# Patient Record
Sex: FEMALE | Race: WHITE | NOT HISPANIC OR LATINO | Employment: OTHER | URBAN - METROPOLITAN AREA
[De-identification: names, ages, dates, MRNs, and addresses within clinical notes are randomized per-mention and may not be internally consistent; named-entity substitution may affect disease eponyms.]

---

## 2017-09-26 ENCOUNTER — HISTORICAL (OUTPATIENT)
Dept: LAB | Facility: HOSPITAL | Age: 72
End: 2017-09-26

## 2017-09-26 LAB
ABS NEUT (OLG): 3.81
ALBUMIN SERPL-MCNC: 3.6 GM/DL (ref 3.4–5)
ALBUMIN/GLOB SERPL: 1 RATIO (ref 1.1–2)
ALP SERPL-CCNC: 90 UNIT/L (ref 50–136)
ALT SERPL-CCNC: 88 UNIT/L (ref 12–78)
APTT PPP: 21.9 SECOND(S) (ref 23–32)
AST SERPL-CCNC: 45 UNIT/L (ref 10–37)
BASOPHILS # BLD AUTO: 0.02 X10(3)/MCL
BASOPHILS NFR BLD AUTO: 0.3 %
BILIRUB SERPL-MCNC: 0.4 MG/DL (ref 0.2–1)
BILIRUBIN DIRECT+TOT PNL SERPL-MCNC: 0.07 MG/DL (ref 0.05–0.2)
BILIRUBIN DIRECT+TOT PNL SERPL-MCNC: 0.33 MG/DL
BUN SERPL-MCNC: 16 MG/DL (ref 7–18)
CALCIUM SERPL-MCNC: 9 MG/DL (ref 8.5–10.1)
CHLORIDE SERPL-SCNC: 105 MMOL/L (ref 98–107)
CO2 SERPL-SCNC: 29.2 MMOL/L (ref 21–32)
CREAT SERPL-MCNC: 0.74 MG/DL (ref 0.55–1.02)
EOSINOPHIL # BLD AUTO: 0.13 X10(3)/MCL
EOSINOPHIL NFR BLD AUTO: 2 %
ERYTHROCYTE [DISTWIDTH] IN BLOOD BY AUTOMATED COUNT: 12 %
GLOBULIN SER-MCNC: 3.6 GM/DL (ref 2.4–3.5)
GLUCOSE SERPL-MCNC: 101 MG/DL (ref 74–106)
HCT VFR BLD AUTO: 41.8 % (ref 34–46)
HGB BLD-MCNC: 14 GM/DL (ref 11.3–15.4)
IMM GRANULOCYTES # BLD AUTO: 0.01 10*3/UL (ref 0–0.1)
IMM GRANULOCYTES NFR BLD AUTO: 0.2 % (ref 0–1)
INR PPP: 0.94
LYMPHOCYTES # BLD AUTO: 1.77 X10(3)/MCL
LYMPHOCYTES NFR BLD AUTO: 27.6 %
MCH RBC QN AUTO: 31 PG (ref 27–33)
MCHC RBC AUTO-ENTMCNC: 33.5 GM/DL (ref 32–35)
MCV RBC AUTO: 92.5 FL (ref 81–97)
MONOCYTES # BLD AUTO: 0.67 X10(3)/MCL
MONOCYTES NFR BLD AUTO: 10.5 %
NEUTROPHILS # BLD AUTO: 3.81 X10(3)/MCL
NEUTROPHILS NFR BLD AUTO: 59.4 %
PLATELET # BLD AUTO: 274 X10(3)/MCL (ref 151–368)
PMV BLD AUTO: 10 FL
POTASSIUM SERPL-SCNC: 4 MMOL/L (ref 3.5–5.1)
PROT SERPL-MCNC: 7.2 GM/DL (ref 6.4–8.2)
PROTHROMBIN TIME: 9.6 SECOND(S) (ref 9–11.5)
RBC # BLD AUTO: 4.52 X10(6)/MCL (ref 3.9–5)
SODIUM SERPL-SCNC: 141 MMOL/L (ref 136–145)
WBC # SPEC AUTO: 6.41 X10(3)/MCL (ref 3.4–9.2)

## 2017-10-16 ENCOUNTER — HISTORICAL (OUTPATIENT)
Dept: LAB | Facility: HOSPITAL | Age: 72
End: 2017-10-16

## 2017-10-16 LAB
ALBUMIN SERPL-MCNC: 3.5 GM/DL (ref 3.4–5)
ALBUMIN/GLOB SERPL: 1 RATIO (ref 1.1–2)
ALP SERPL-CCNC: 69 UNIT/L (ref 50–136)
ALT SERPL-CCNC: 44 UNIT/L (ref 12–78)
AST SERPL-CCNC: 31 UNIT/L (ref 10–37)
BILIRUB SERPL-MCNC: 0.4 MG/DL (ref 0.2–1)
BILIRUBIN DIRECT+TOT PNL SERPL-MCNC: 0.06 MG/DL (ref 0.05–0.2)
BILIRUBIN DIRECT+TOT PNL SERPL-MCNC: 0.31 MG/DL
BUN SERPL-MCNC: 14 MG/DL (ref 7–18)
CALCIUM SERPL-MCNC: 8.6 MG/DL (ref 8.5–10.1)
CHLORIDE SERPL-SCNC: 106 MMOL/L (ref 98–107)
CHOLEST SERPL-MCNC: 206 MG/DL (ref 50–200)
CHOLEST/HDLC SERPL: 4 {RATIO} (ref 0–5)
CO2 SERPL-SCNC: 28.7 MMOL/L (ref 21–32)
CREAT SERPL-MCNC: 0.7 MG/DL (ref 0.55–1.02)
GLOBULIN SER-MCNC: 3.5 GM/DL (ref 2.4–3.5)
GLUCOSE SERPL-MCNC: 92 MG/DL (ref 74–106)
HDLC SERPL-MCNC: 56 MG/DL (ref 35–60)
LDLC SERPL CALC-MCNC: 126.4 MG/DL (ref 50–140)
POTASSIUM SERPL-SCNC: 3.8 MMOL/L (ref 3.5–5.1)
PROT SERPL-MCNC: 7 GM/DL (ref 6.4–8.2)
SODIUM SERPL-SCNC: 142 MMOL/L (ref 136–145)
TRIGL SERPL-MCNC: 118 MG/DL (ref 30–150)
VLDLC SERPL CALC-MCNC: 24 MG/DL

## 2017-10-18 ENCOUNTER — HISTORICAL (OUTPATIENT)
Dept: MEDSURG UNIT | Facility: HOSPITAL | Age: 72
End: 2017-10-18

## 2017-10-18 LAB — CRC RECOMMENDATION EXT: NORMAL

## 2018-04-06 ENCOUNTER — HISTORICAL (OUTPATIENT)
Dept: LAB | Facility: HOSPITAL | Age: 73
End: 2018-04-06

## 2018-04-06 LAB
ALBUMIN SERPL-MCNC: 3.8 GM/DL (ref 3.4–5)
ALBUMIN/GLOB SERPL: 1 RATIO (ref 1.1–2)
ALP SERPL-CCNC: 84 UNIT/L (ref 46–116)
ALT SERPL-CCNC: 71 UNIT/L (ref 12–78)
AST SERPL-CCNC: 47 UNIT/L (ref 10–37)
BILIRUB SERPL-MCNC: 0.3 MG/DL (ref 0.2–1)
BILIRUBIN DIRECT+TOT PNL SERPL-MCNC: 0.12 MG/DL (ref 0–0.2)
BILIRUBIN DIRECT+TOT PNL SERPL-MCNC: 0.18 MG/DL
BUN SERPL-MCNC: 23 MG/DL (ref 7–18)
CALCIUM SERPL-MCNC: 9.3 MG/DL (ref 8.5–10.1)
CHLORIDE SERPL-SCNC: 100 MMOL/L (ref 98–107)
CO2 SERPL-SCNC: 29.2 MMOL/L (ref 21–32)
CREAT SERPL-MCNC: 0.7 MG/DL (ref 0.55–1.02)
FOLATE SERPL-MCNC: 22.1 NG/ML (ref 3.1–17.5)
GLOBULIN SER-MCNC: 3.8 GM/DL (ref 2.4–3.5)
GLUCOSE SERPL-MCNC: 98 MG/DL (ref 74–106)
POTASSIUM SERPL-SCNC: 4.8 MMOL/L (ref 3.5–5.1)
PROT SERPL-MCNC: 7.6 GM/DL (ref 6.4–8.2)
RPR SER QL: NORMAL
SODIUM SERPL-SCNC: 137 MMOL/L (ref 136–145)
TSH SERPL-ACNC: 1.31 MIU/ML (ref 0.35–3.75)
VIT B12 SERPL-MCNC: 583 PG/ML (ref 193–986)

## 2019-04-05 ENCOUNTER — HISTORICAL (OUTPATIENT)
Dept: LAB | Facility: HOSPITAL | Age: 74
End: 2019-04-05

## 2019-04-05 LAB
ALBUMIN SERPL-MCNC: 3.7 GM/DL (ref 3.4–5)
ALBUMIN/GLOB SERPL: 1.1 RATIO (ref 1.1–2)
ALP SERPL-CCNC: 69 UNIT/L (ref 46–116)
ALT SERPL-CCNC: 22 UNIT/L (ref 12–78)
AST SERPL-CCNC: 19 UNIT/L (ref 10–37)
BILIRUB SERPL-MCNC: 0.4 MG/DL (ref 0.2–1)
BILIRUBIN DIRECT+TOT PNL SERPL-MCNC: 0.12 MG/DL (ref 0–0.2)
BILIRUBIN DIRECT+TOT PNL SERPL-MCNC: 0.28 MG/DL
BUN SERPL-MCNC: 20 MG/DL (ref 7–18)
CALCIUM SERPL-MCNC: 8.9 MG/DL (ref 8.5–10.1)
CHLORIDE SERPL-SCNC: 105 MMOL/L (ref 98–107)
CHOLEST SERPL-MCNC: 219 MG/DL (ref 50–200)
CHOLEST/HDLC SERPL: 4 {RATIO} (ref 0–5)
CO2 SERPL-SCNC: 28.8 MMOL/L (ref 21–32)
CREAT SERPL-MCNC: 0.75 MG/DL (ref 0.55–1.02)
GLOBULIN SER-MCNC: 3.4 GM/DL (ref 2.4–3.5)
GLUCOSE SERPL-MCNC: 105 MG/DL (ref 74–106)
HDLC SERPL-MCNC: 55 MG/DL (ref 35–60)
LDLC SERPL CALC-MCNC: 140 MG/DL (ref 50–140)
POTASSIUM SERPL-SCNC: 3.8 MMOL/L (ref 3.5–5.1)
PROT SERPL-MCNC: 7.1 GM/DL (ref 6.4–8.2)
SODIUM SERPL-SCNC: 140 MMOL/L (ref 136–145)
TRIGL SERPL-MCNC: 122 MG/DL (ref 30–150)
TSH SERPL-ACNC: 1.19 MIU/ML (ref 0.35–3.75)
VLDLC SERPL CALC-MCNC: 24 MG/DL

## 2019-04-16 ENCOUNTER — HISTORICAL (OUTPATIENT)
Dept: RADIOLOGY | Facility: HOSPITAL | Age: 74
End: 2019-04-16

## 2019-08-22 ENCOUNTER — HISTORICAL (OUTPATIENT)
Dept: LAB | Facility: HOSPITAL | Age: 74
End: 2019-08-22

## 2019-08-22 LAB
BILIRUB SERPL-MCNC: NEGATIVE MG/DL
BLOOD URINE, POC: NORMAL
CLARITY, POC UA: CLEAR
COLOR, POC UA: YELLOW
GLUCOSE UR QL STRIP: NEGATIVE
KETONES UR QL STRIP: NEGATIVE
LEUKOCYTE EST, POC UA: NORMAL
NITRITE, POC UA: NEGATIVE
PH, POC UA: 5
PROTEIN, POC: NEGATIVE
SPECIFIC GRAVITY, POC UA: 1.02
UROBILINOGEN, POC UA: NORMAL

## 2019-08-24 LAB — FINAL CULTURE: NORMAL

## 2020-05-12 ENCOUNTER — HISTORICAL (OUTPATIENT)
Dept: LAB | Facility: HOSPITAL | Age: 75
End: 2020-05-12

## 2020-05-12 LAB
ALBUMIN SERPL-MCNC: 3.7 GM/DL (ref 3.4–4.8)
ALBUMIN/GLOB SERPL: 1.2 RATIO (ref 1.1–2)
ALP SERPL-CCNC: 58 UNIT/L (ref 40–150)
ALT SERPL-CCNC: 28 UNIT/L (ref 0–55)
AST SERPL-CCNC: 25 UNIT/L (ref 5–34)
BILIRUB SERPL-MCNC: 0.4 MG/DL
BILIRUBIN DIRECT+TOT PNL SERPL-MCNC: 0.1 MG/DL (ref 0–0.5)
BILIRUBIN DIRECT+TOT PNL SERPL-MCNC: 0.3 MG/DL
BUN SERPL-MCNC: 17 MG/DL (ref 9.8–20.1)
CALCIUM SERPL-MCNC: 8.8 MG/DL (ref 8.4–10.2)
CHLORIDE SERPL-SCNC: 107 MMOL/L (ref 98–107)
CHOLEST SERPL-MCNC: 243 MG/DL
CHOLEST/HDLC SERPL: 4 {RATIO} (ref 0–5)
CO2 SERPL-SCNC: 29 MEQ/L (ref 23–31)
CREAT SERPL-MCNC: 0.68 MG/DL (ref 0.55–1.02)
GLOBULIN SER-MCNC: 3.1 GM/DL (ref 2.4–3.5)
GLUCOSE SERPL-MCNC: 101 MG/DL (ref 82–115)
HDLC SERPL-MCNC: 54 MG/DL (ref 35–60)
LDLC SERPL CALC-MCNC: 156 MG/DL (ref 50–140)
POTASSIUM SERPL-SCNC: 4.4 MMOL/L (ref 3.5–5.1)
PROT SERPL-MCNC: 6.8 GM/DL (ref 5.8–7.6)
SODIUM SERPL-SCNC: 146 MMOL/L (ref 136–145)
TRIGL SERPL-MCNC: 165 MG/DL (ref 37–140)
VLDLC SERPL CALC-MCNC: 33 MG/DL

## 2021-01-14 ENCOUNTER — HISTORICAL (OUTPATIENT)
Dept: LAB | Facility: HOSPITAL | Age: 76
End: 2021-01-14

## 2021-01-14 LAB
ABS NEUT (OLG): 4.86
ALBUMIN SERPL-MCNC: 3.8 GM/DL (ref 3.4–4.8)
ALBUMIN/GLOB SERPL: 1.3 RATIO (ref 1.1–2)
ALP SERPL-CCNC: 69 UNIT/L (ref 40–150)
ALT SERPL-CCNC: 28 UNIT/L (ref 0–55)
APPEARANCE, UA: CLEAR
AST SERPL-CCNC: 23 UNIT/L (ref 5–34)
BACTERIA SPEC CULT: ABNORMAL
BASOPHILS # BLD AUTO: 0.02 X10(3)/MCL
BASOPHILS NFR BLD AUTO: 0.3 %
BILIRUB SERPL-MCNC: 0.4 MG/DL
BILIRUB UR QL STRIP: NEGATIVE
BILIRUBIN DIRECT+TOT PNL SERPL-MCNC: 0.1 MG/DL (ref 0–0.5)
BILIRUBIN DIRECT+TOT PNL SERPL-MCNC: 0.3 MG/DL
BUN SERPL-MCNC: 11 MG/DL (ref 9.8–20.1)
CALCIUM SERPL-MCNC: 8.6 MG/DL (ref 8.4–10.2)
CHLORIDE SERPL-SCNC: 105 MMOL/L (ref 98–107)
CHOLEST SERPL-MCNC: 243 MG/DL
CHOLEST/HDLC SERPL: 5 {RATIO} (ref 0–5)
CO2 SERPL-SCNC: 31 MEQ/L (ref 23–31)
COLOR UR: YELLOW
CREAT SERPL-MCNC: 0.76 MG/DL (ref 0.55–1.02)
DEPRECATED CALCIDIOL+CALCIFEROL SERPL-MC: 26.1 NG/ML (ref 30–80)
EOSINOPHIL # BLD AUTO: 0.15 X10(3)/MCL
EOSINOPHIL NFR BLD AUTO: 2.1 %
ERYTHROCYTE [DISTWIDTH] IN BLOOD BY AUTOMATED COUNT: 12 %
GLOBULIN SER-MCNC: 3 GM/DL (ref 2.4–3.5)
GLUCOSE (UA): NEGATIVE
GLUCOSE SERPL-MCNC: 103 MG/DL (ref 82–115)
HCT VFR BLD AUTO: 43.3 % (ref 34–46)
HDLC SERPL-MCNC: 47 MG/DL (ref 35–60)
HGB BLD-MCNC: 13.9 GM/DL (ref 11.3–15.4)
HGB UR QL STRIP: ABNORMAL
IMM GRANULOCYTES # BLD AUTO: 0.01 10*3/UL (ref 0–0.1)
IMM GRANULOCYTES NFR BLD AUTO: 0.1 % (ref 0–1)
KETONES UR QL STRIP: NEGATIVE
LDLC SERPL CALC-MCNC: 143 MG/DL (ref 50–140)
LEUKOCYTE ESTERASE UR QL STRIP: NEGATIVE
LYMPHOCYTES # BLD AUTO: 1.51 X10(3)/MCL
LYMPHOCYTES NFR BLD AUTO: 21.3 %
MCH RBC QN AUTO: 30.5 PG (ref 27–33)
MCHC RBC AUTO-ENTMCNC: 32.1 GM/DL (ref 32–35)
MCV RBC AUTO: 95.2 FL (ref 81–97)
MONOCYTES # BLD AUTO: 0.55 X10(3)/MCL
MONOCYTES NFR BLD AUTO: 7.7 %
NEUTROPHILS # BLD AUTO: 4.86 X10(3)/MCL
NEUTROPHILS NFR BLD AUTO: 68.5 %
NITRITE UR QL STRIP: NEGATIVE
PH UR STRIP: 8 [PH] (ref 4.6–8)
PLATELET # BLD AUTO: 242 X10(3)/MCL (ref 140–450)
PMV BLD AUTO: 9 FL
POTASSIUM SERPL-SCNC: 4.3 MMOL/L (ref 3.5–5.1)
PROT SERPL-MCNC: 6.8 GM/DL (ref 5.8–7.6)
PROT UR QL STRIP: NEGATIVE
RBC # BLD AUTO: 4.55 X10(6)/MCL (ref 3.9–5)
RBC #/AREA URNS HPF: ABNORMAL /[HPF]
SODIUM SERPL-SCNC: 143 MMOL/L (ref 136–145)
SP GR UR STRIP: 1.02 (ref 1–1.03)
SQUAMOUS EPITHELIAL, UA: ABNORMAL
TRIGL SERPL-MCNC: 267 MG/DL (ref 37–140)
UROBILINOGEN UR STRIP-ACNC: 0.2
VLDLC SERPL CALC-MCNC: 53 MG/DL
WBC # SPEC AUTO: 7.1 X10(3)/MCL (ref 3.4–9.2)
WBC #/AREA URNS HPF: ABNORMAL /HPF

## 2021-04-05 ENCOUNTER — HISTORICAL (OUTPATIENT)
Dept: LAB | Facility: HOSPITAL | Age: 76
End: 2021-04-05

## 2021-04-05 LAB — SARS-COV-2 RNA RESP QL NAA+PROBE: DETECTED

## 2021-04-07 ENCOUNTER — HISTORICAL (OUTPATIENT)
Dept: INFECTIOUS DISEASES | Facility: HOSPITAL | Age: 76
End: 2021-04-07

## 2021-04-27 ENCOUNTER — HISTORICAL (OUTPATIENT)
Dept: LAB | Facility: HOSPITAL | Age: 76
End: 2021-04-27

## 2021-04-28 ENCOUNTER — HISTORICAL (OUTPATIENT)
Dept: LAB | Facility: HOSPITAL | Age: 76
End: 2021-04-28

## 2021-04-28 LAB
ALBUMIN SERPL-MCNC: 3.6 GM/DL (ref 3.4–4.8)
ALBUMIN/GLOB SERPL: 0.9 RATIO (ref 1.1–2)
ALP SERPL-CCNC: 96 UNIT/L (ref 40–150)
ALT SERPL-CCNC: 28 UNIT/L (ref 0–55)
AST SERPL-CCNC: 22 UNIT/L (ref 5–34)
BILIRUB SERPL-MCNC: 0.4 MG/DL
BILIRUBIN DIRECT+TOT PNL SERPL-MCNC: 0.2 MG/DL
BILIRUBIN DIRECT+TOT PNL SERPL-MCNC: 0.2 MG/DL (ref 0–0.5)
BUN SERPL-MCNC: 20 MG/DL (ref 9.8–20.1)
CALCIUM SERPL-MCNC: 9.5 MG/DL (ref 8.4–10.2)
CHLORIDE SERPL-SCNC: 107 MMOL/L (ref 98–107)
CHOLEST SERPL-MCNC: 171 MG/DL
CHOLEST/HDLC SERPL: 4 {RATIO} (ref 0–5)
CO2 SERPL-SCNC: 31 MEQ/L (ref 23–31)
CREAT SERPL-MCNC: 0.79 MG/DL (ref 0.55–1.02)
GLOBULIN SER-MCNC: 3.9 GM/DL (ref 2.4–3.5)
GLUCOSE SERPL-MCNC: 95 MG/DL (ref 82–115)
HDLC SERPL-MCNC: 44 MG/DL (ref 35–60)
LDLC SERPL CALC-MCNC: 97 MG/DL (ref 50–140)
POTASSIUM SERPL-SCNC: 4.2 MMOL/L (ref 3.5–5.1)
PROT SERPL-MCNC: 7.5 GM/DL (ref 5.8–7.6)
SODIUM SERPL-SCNC: 145 MMOL/L (ref 136–145)
TRIGL SERPL-MCNC: 149 MG/DL (ref 37–140)
VLDLC SERPL CALC-MCNC: 30 MG/DL

## 2021-04-29 ENCOUNTER — HISTORICAL (OUTPATIENT)
Dept: RADIOLOGY | Facility: HOSPITAL | Age: 76
End: 2021-04-29

## 2021-06-04 ENCOUNTER — HISTORICAL (OUTPATIENT)
Dept: RADIOLOGY | Facility: HOSPITAL | Age: 76
End: 2021-06-04

## 2021-06-04 LAB
BCS RECOMMENDATION EXT: NORMAL
BMD RECOMMENDATION EXT: NORMAL

## 2021-08-04 ENCOUNTER — HISTORICAL (OUTPATIENT)
Dept: LAB | Facility: HOSPITAL | Age: 76
End: 2021-08-04

## 2021-08-04 LAB — SARS-COV-2 AG RESP QL IA.RAPID: NEGATIVE

## 2022-04-10 ENCOUNTER — HISTORICAL (OUTPATIENT)
Dept: ADMINISTRATIVE | Facility: HOSPITAL | Age: 77
End: 2022-04-10

## 2022-04-28 VITALS
DIASTOLIC BLOOD PRESSURE: 67 MMHG | WEIGHT: 147.25 LBS | BODY MASS INDEX: 27.1 KG/M2 | HEIGHT: 62 IN | SYSTOLIC BLOOD PRESSURE: 115 MMHG | OXYGEN SATURATION: 98 %

## 2022-04-30 NOTE — H&P
Patient:   Lupis Peguero            MRN: [id number]            FIN: [id number]               Age:   72 years     Sex:  Female     :  1945   Associated Diagnoses:   None   Author:   Naveed Chapman MD      Visit Information   Accompanied by:  No one.    Source of history:  Self.       Chief Complaint   2017 10:07 CDT      ROUTIEN CHECK LEFT EYE VISION CHANGES        History of Present Illness   HTN  - jimmy med  - no HA  - pt without any complaints               The patient presents for a general exam.        Interval History   Hyperlipidemia  - watching diet      Review of Systems   Constitutional   Eye:  Recent visual problem, Visual disturbances, Left eye, intermittent, flashing lights on outer half of eye.    Cardiovascular:  No chest pain, No peripheral edema.    Respiratory:  No shortness of breath.    Gastrointestinal:  no fam hx of colon cancer, last colonoscopy at age 52, No nausea, No vomiting, No diarrhea, No constipation, No change in bowel habits, No melena.         Rectal bleeding: None.    Endocrine   Musculoskeletal:  No muscle pain.    Neurologic:  No headache.       Health Status   Allergies:    Allergic Reactions (Selected)  No Known Allergies,    Allergies (1) Active Reaction  No Known Allergies None Documented     Current medications:  (Selected)   Prescriptions  Prescribed  lisinopril 10 mg oral tablet: 10 mg = 1 tab(s), Oral, Daily, # 90 tab(s), 3 Refill(s), Pharmacy: [facility]/pharmacy #[id number]  montelukast 10 mg oral TABLET: See Instructions, TAKE 1 TABLET BY MOUTH ONCE DAILY, # 30 tab(s), 2 Refill(s), eRx: [facility]/pharmacy #[id number]  Documented Medications  Documented  CoQ10: 300 mg, Oral, Daily, 0 Refill(s)  melatonin 10 mg oral capsule: 10 mg = 1 cap(s), Oral, Once a day (at bedtime), 0 Refill(s),    Home Medications (4) Active  CoQ10 300 mg, Oral, Daily  lisinopril 10 mg oral tablet 10 mg = 1 tab(s), Oral, Daily  melatonin 10 mg oral capsule 10 mg = 1 cap(s), Oral, Once a day  (at bedtime)  montelukast 10 mg oral TABLET See Instructions     Problem list:    All Problems  Memory loss / SNOMED CT 43222048 / Confirmed  Anxiety / SNOMED CT 61449765 / Confirmed  GERD - Gastro-esophageal reflux disease / SNOMED CT 3959072811 / Confirmed  Hyperlipidemia / SNOMED CT 24808613 / Confirmed  Hypertension / SNOMED CT 8528389398 / Confirmed  Osteoporosis / SNOMED CT 234626962 / Confirmed  Resolved: Other screening mammogram / SNOMED CT 827844110  Resolved: Skin lesion / SNOMED CT 620988480,    Active Problems (6)  Anxiety   GERD - Gastro-esophageal reflux disease   Hyperlipidemia   Hypertension   Memory loss   Osteoporosis         Histories   Past Medical History:    Active  Anxiety (95954458)  GERD - Gastro-esophageal reflux disease (2326570182)  Hypertension (9146992487)  Hyperlipidemia (81755865)  Osteoporosis (044960398)  Memory loss (17766280)  Resolved  Skin lesion (468733834):  Resolved.  Other screening mammogram (880981622):  Resolved.   Family History:    Diabetes mellitus type 2  Father  CAD (coronary artery disease)  Mother  Hypertension.  Mother  Father     Procedure history:    Excision Synovial Cyst (Left, Hand) performed by Donta Reeves MD on 9/1/2016 at 71 Years.  Comments:  9/1/2016 11:04 - Jorden Neal CRNA.  auto-populated from documented surgical case  Excision Mass Thigh (Left, Upper Leg) performed by Donta Reeves MD on 9/1/2016 at 71 Years.  Comments:  9/1/2016 11:04 - Jorden Neal CRNA.  auto-populated from documented surgical case  BACK SURG.   Social History        Social & Psychosocial Habits    Alcohol  09/22/2015  Use: Never    Home/Environment  09/25/2017  Lives with: Alone    Substance Abuse  09/22/2015  Use: Never    Tobacco  09/22/2015  Use: Never smoker  .        Physical Examination   Vital Signs   9/25/2017 10:07 CDT      Temperature Tympanic      36.2 DegC                             Peripheral Pulse Rate     80 bpm                              Respiratory Rate          18 br/min                             Systolic Blood Pressure   136 mmHg                             Diastolic Blood Pressure  80 mmHg     Measurements from flowsheet : Measurements   9/25/2017 10:07 CDT      Weight Dosing             64.2 kg                             Weight Measured           64.12 kg                             Weight Measured and Calculated in Lbs     141.54 lb                             Height/Length Dosing      157 cm                             Height/Length Measured    157 cm                             BSA Measured              1.67 m2                             Body Mass Index Measured  26.01 kg/m2     General:  Alert and oriented, No acute distress.         Skin: No cyanosis, No clubbing, No edema.    Eye:  Pupils are equal, round and reactive to light, Extraocular movements are intact.    Respiratory:  Lungs are clear to auscultation, Breath sounds are equal, Symmetrical chest wall expansion, No wheezes/rales/rhonchi.    Cardiovascular:  Regular rhythm, No murmur, No gallop, No Rub.    Gastrointestinal:  Soft, Non-tender, Non-distended, Normal bowel sounds.    Integumentary:  Warm.    Neurologic:  Alert, Oriented.    Psychiatric:  Cooperative, Appropriate mood & affect, Normal judgment.       Health Maintenance      Review / Management   Laboratory Results   Reviewed labs   Documentation reviewed:  Reviewed prior records.       Impression and Plan   Diagnosis     Colon cancer screening (EIL44-QB Z12.11).     Hypertension (XDO79-FR I10).     Hyperlipidemia (JAO11-NZ E78.5).     Visual disturbance (YCK63-WX H53.9).     Plan:  1.  Hypertension  Controlled  Continue current medication  2.  Hyperlipidemia  Continue diet modification  Check FLP  3.  Visual disturbance  Patient to schedule an appointment with [facility]  4.  Colon cancer screening  Procedure discussed at length with the patient  Risk and other choices were discussed  Patient agreed to go  forward with the procedure  Colon Preparation sheet was given  Prescription for the colon prep was given  Patient gives consent to discuss the results with any family members on the morning of the procedure  Procedure consents were signed  Admit orders were given  Procedure was scheduled for 10/18/17  5.  Screening  Check lab work (CMP, CBC, FLP)      I have examined the patient and there is no change in the patient's condition since the recent history and physical exam was performed.

## 2022-06-06 DIAGNOSIS — Z00.00 WELLNESS EXAMINATION: Primary | ICD-10-CM

## 2022-06-06 DIAGNOSIS — Z79.899 LONG-TERM USE OF HIGH-RISK MEDICATION: ICD-10-CM

## 2022-06-06 RX ORDER — ATORVASTATIN CALCIUM 10 MG/1
1 TABLET, FILM COATED ORAL DAILY
COMMUNITY
Start: 2021-07-06 | End: 2022-06-10

## 2022-06-06 RX ORDER — FLUOXETINE 10 MG/1
1 CAPSULE ORAL DAILY
COMMUNITY
Start: 2022-02-01 | End: 2022-08-08

## 2022-06-06 RX ORDER — ESOMEPRAZOLE MAGNESIUM 40 MG/1
1 CAPSULE, DELAYED RELEASE ORAL DAILY
COMMUNITY
Start: 2021-10-25 | End: 2022-10-03

## 2022-06-06 RX ORDER — LISINOPRIL 20 MG/1
1 TABLET ORAL DAILY
COMMUNITY
Start: 2021-11-24 | End: 2022-08-19

## 2022-06-06 RX ORDER — MONTELUKAST SODIUM 10 MG/1
1 TABLET ORAL DAILY
COMMUNITY
Start: 2021-10-07 | End: 2022-11-09 | Stop reason: SDUPTHER

## 2022-06-06 RX ORDER — ALBUTEROL SULFATE 90 UG/1
1 AEROSOL, METERED RESPIRATORY (INHALATION) EVERY 6 HOURS PRN
COMMUNITY
Start: 2021-05-17 | End: 2022-08-09

## 2022-06-06 RX ORDER — FLUTICASONE PROPIONATE 50 MCG
1 SPRAY, SUSPENSION (ML) NASAL DAILY
COMMUNITY
Start: 2021-12-06 | End: 2024-01-03 | Stop reason: SDUPTHER

## 2022-06-07 ENCOUNTER — OFFICE VISIT (OUTPATIENT)
Dept: FAMILY MEDICINE | Facility: CLINIC | Age: 77
End: 2022-06-07
Payer: MEDICARE

## 2022-06-07 VITALS
TEMPERATURE: 98 F | OXYGEN SATURATION: 97 % | WEIGHT: 149.94 LBS | BODY MASS INDEX: 27.59 KG/M2 | HEART RATE: 76 BPM | RESPIRATION RATE: 16 BRPM | DIASTOLIC BLOOD PRESSURE: 71 MMHG | SYSTOLIC BLOOD PRESSURE: 136 MMHG | HEIGHT: 62 IN

## 2022-06-07 DIAGNOSIS — F33.42 RECURRENT MAJOR DEPRESSIVE DISORDER, IN FULL REMISSION: ICD-10-CM

## 2022-06-07 DIAGNOSIS — Z28.21 HERPES ZOSTER VACCINATION DECLINED: ICD-10-CM

## 2022-06-07 DIAGNOSIS — F51.01 PRIMARY INSOMNIA: ICD-10-CM

## 2022-06-07 DIAGNOSIS — E78.2 MIXED HYPERLIPIDEMIA: ICD-10-CM

## 2022-06-07 DIAGNOSIS — I10 PRIMARY HYPERTENSION: ICD-10-CM

## 2022-06-07 DIAGNOSIS — E55.9 VITAMIN D DEFICIENCY: ICD-10-CM

## 2022-06-07 DIAGNOSIS — Z53.20 MAMMOGRAM DECLINED: ICD-10-CM

## 2022-06-07 DIAGNOSIS — Z00.00 MEDICARE ANNUAL WELLNESS VISIT, SUBSEQUENT: Primary | ICD-10-CM

## 2022-06-07 DIAGNOSIS — R06.02 SHORTNESS OF BREATH: ICD-10-CM

## 2022-06-07 DIAGNOSIS — K21.9 GASTROESOPHAGEAL REFLUX DISEASE, UNSPECIFIED WHETHER ESOPHAGITIS PRESENT: ICD-10-CM

## 2022-06-07 PROBLEM — G47.00 INSOMNIA: Status: ACTIVE | Noted: 2022-06-07

## 2022-06-07 PROBLEM — R41.3 AMNESIA: Status: ACTIVE | Noted: 2022-06-07

## 2022-06-07 PROBLEM — F33.9 RECURRENT MAJOR DEPRESSIVE DISORDER: Status: ACTIVE | Noted: 2022-06-07

## 2022-06-07 PROBLEM — E78.5 HYPERLIPIDEMIA: Status: ACTIVE | Noted: 2022-06-07

## 2022-06-07 PROBLEM — F41.9 ANXIETY: Status: ACTIVE | Noted: 2022-06-07

## 2022-06-07 PROCEDURE — G0439 PR MEDICARE ANNUAL WELLNESS SUBSEQUENT VISIT: ICD-10-PCS | Mod: ,,, | Performed by: FAMILY MEDICINE

## 2022-06-07 PROCEDURE — G0439 PPPS, SUBSEQ VISIT: HCPCS | Mod: ,,, | Performed by: FAMILY MEDICINE

## 2022-06-07 RX ORDER — DOCUSATE SODIUM 100 MG/1
100 CAPSULE, LIQUID FILLED ORAL 2 TIMES DAILY
COMMUNITY
End: 2023-03-06

## 2022-06-07 RX ORDER — ACETAMINOPHEN, DIPHENHYDRAMINE HCL, PHENYLEPHRINE HCL 325; 25; 5 MG/1; MG/1; MG/1
TABLET ORAL
COMMUNITY

## 2022-06-07 RX ORDER — ASPIRIN 81 MG/1
81 TABLET ORAL DAILY
COMMUNITY

## 2022-06-07 RX ORDER — CHOLECALCIFEROL (VITAMIN D3) 25 MCG
1000 TABLET ORAL DAILY
COMMUNITY

## 2022-06-07 RX ORDER — EPINEPHRINE 0.22MG
100 AEROSOL WITH ADAPTER (ML) INHALATION DAILY
COMMUNITY

## 2022-06-07 NOTE — PROGRESS NOTES
Lupis Peguero is a 76 y.o. female who presents today for her subsequent annual wellness visit. It has been at least 11 months since his previous annual wellness visit.     74 yo female here for wellness visit. She has never smoked. She does not drink. She does not exercise and eats a regular diet. Denies melena, hematochezia, vaginal bleeding. no recent falls      MM/21, declines this year. Patient was counseled on risks of declining breast cancer screening. All questions were answered. Patient verbalized understanding.  DEXA:   C-scope:  normal, repeat    Zostavax: declined due to financial reasons  Pneumovax 23:   Advance Directive:     HTN: at goal.  She is on lisinopril 20 mg daily. ON aspirin    HLD: doing well on statin    Depression: well controlled with prozac    GERD: well controlled with PPI    Insomnia: well controlled with trazodone 100 mg nightly Review of Systems General: denies f/c, weight loss, night sweats, decreased appetite      Past/personal medical history, Active problem list, family history, social history, allergies and medication list have all been reviewed, updated and documented during this encounter. Also a list of all current physicians and/or suppliers of medical care has been updated in the Care Teams section of this encounter.      PHQ2 questionnaire has been completed for this patient and it is located in the flow sheets section of this encounter.  The patient completed HRA from her previous AWV was reviewed with the her again today and any identified issues are listed in the diagnoses and health risks section below.    Other tests done today and results:  1. Whisper test - Normal  2. Get up and go test - Normal  3. MiniCog test (results scanned into chart) - Normal    I attest that I have reviewed the flow sheeting and nursing notes which includes the TUG test, Whisper Screen, HRA, Mini-cognitive assessment, Fall and Depression Screen.    As a separate e/m  "visit, patient c/o shortness of breath x 6 months. It started around the time she had covid. States that it only happens when she bends forward, not when walking or climbing stairs. She also gets short of breath if she is taking too much. Denies wheezing, coughing. She does not have asthma or copd, she has never smoked. She has been exposed to second hand smoke. Both parents had heart disease. Denies cp, chest pressure, palpitations, edema, headaches, vision changes.         Review of Systems     General: denies f/c, weight loss, night sweats, decreased appetite  Eye: denies blurred vision, changes in vision  Respiratory: denies  wheezing, cough  Cardiovascular: denies chest pain, palpitations, edema  Gastrointestinal: denies abdominal pain, n/v, constipation, diarrhea  Integumentary: denies rashes, pruritis      Vitals:    06/07/22 0940   BP: 136/71   Pulse: 76   Resp: 16   Temp: 98.1 °F (36.7 °C)   TempSrc: Tympanic   SpO2: 97%   Weight: 68 kg (149 lb 14.6 oz)   Height: 5' 1.81" (1.57 m)     Physical Exam  Gen: alert pleasant  Eyes: EOMI, no scleral injection  ENT: TM+ light reflexes bilaterally, no cerumen impaction or canal edema/erythema/discharge. No pharyngeal erythema, tonsillary hypertrophy  Oropharynx: no tonsillary hypertrophy, dental caries or disease. No lesions of palate, tongue.  CV: RRR no m/r/g, no LE edema. No JVD  Resp: CTAB, no accessory muscle use  GI: BS+, nontender, nondistended  Lymph: no cervical, postauricular, submandibular nodes  Psych: AA&Ox3    Medicare annual wellness visit, subsequent [Z00.00]      Diagnoses and health risks identified today and associated recommendations/orders:  There are no diagnoses linked to this encounter.        1. Medicare annual wellness visit, subsequent    2. Primary hypertension  Overview:  at goal. She is on lisinopril 20 mg daily. ON aspirin        Assessment & Plan:  continue current meds, low salt diet, weight loss and exercise  Avoid drinking too much " caffeine  Call me with pressure more than 140/90        3. Mixed hyperlipidemia  Overview:  At goal on statin    Assessment & Plan:  Continue statin      4. Recurrent major depressive disorder, in full remission  Overview:  Well controlled with prozac    Assessment & Plan:  Continue prozac      5. Primary insomnia  Overview:  Well controlled with trazodone 100 mg nightly    Assessment & Plan:  Continue trazodone      6. Gastroesophageal reflux disease, unspecified whether esophagitis present  Overview:  Well controlled with nexium    Assessment & Plan:  Continue nexium and GERD diet      7. Vitamin D deficiency    8. Shortness of breath  -     X-Ray Chest PA And Lateral  -     EKG 12-lead  -     Complete PFT with bronchodilator    9. Mammogram declined    10. Herpes zoster vaccination declined      Exercise for a total of 150 min per week and eat a healthy diet  Stay up to date with all cancer screening discussed in visit  Immunizations due were discussed during visit  All health maintenance was reviewed with patient. Patient verbalized understanding. All questions were answered.   Patient was counseled on risks/benefits of receiving immunization. All questions were answered    An updated personalized 5-10 year written screening and prevention plan was provided to her today. Please see the AVW for those details.  The health maintenance module has been updated during this encounter.  Follow up in about 6 months (around 12/7/2022) for routine follow up.

## 2022-06-08 ENCOUNTER — PATIENT MESSAGE (OUTPATIENT)
Dept: FAMILY MEDICINE | Facility: CLINIC | Age: 77
End: 2022-06-08
Payer: MEDICARE

## 2022-06-08 ENCOUNTER — TELEPHONE (OUTPATIENT)
Dept: FAMILY MEDICINE | Facility: CLINIC | Age: 77
End: 2022-06-08
Payer: MEDICARE

## 2022-06-08 ENCOUNTER — CLINICAL SUPPORT (OUTPATIENT)
Dept: RESPIRATORY THERAPY | Facility: HOSPITAL | Age: 77
End: 2022-06-08
Attending: FAMILY MEDICINE
Payer: MEDICARE

## 2022-06-08 ENCOUNTER — HOSPITAL ENCOUNTER (OUTPATIENT)
Dept: RADIOLOGY | Facility: HOSPITAL | Age: 77
Discharge: HOME | End: 2022-06-08
Attending: FAMILY MEDICINE
Payer: MEDICARE

## 2022-06-08 DIAGNOSIS — R06.02 SHORTNESS OF BREATH: ICD-10-CM

## 2022-06-08 PROCEDURE — 71046 X-RAY EXAM CHEST 2 VIEWS: CPT | Mod: TC

## 2022-06-08 PROCEDURE — 93005 ELECTROCARDIOGRAM TRACING: CPT

## 2022-06-08 PROCEDURE — 99900031 HC PATIENT EDUCATION (STAT)

## 2022-06-08 NOTE — TELEPHONE ENCOUNTER
----- Message from Nhung Farrell MD sent at 6/8/2022  2:06 PM CDT -----  Cholesterol is much worse. Is she taking statin daily? If so, I would recommend increasing.     She does have some blood and leukocytes in urine. Is she having any uti symptoms?     Ekg and cxr normal. We will see what pft shows.

## 2022-06-09 NOTE — TELEPHONE ENCOUNTER
Patient states she is taking daily statin. Would like to increase dosage.     Pt states she had a little itch in vaginal area, but denies all UTI symptoms.

## 2022-06-10 RX ORDER — ATORVASTATIN CALCIUM 20 MG/1
20 TABLET, FILM COATED ORAL DAILY
Qty: 90 TABLET | Refills: 6 | Status: SHIPPED | OUTPATIENT
Start: 2022-06-10 | End: 2023-09-05

## 2022-08-08 DIAGNOSIS — Z11.52 ENCOUNTER FOR SCREENING FOR COVID-19: Primary | ICD-10-CM

## 2022-08-09 ENCOUNTER — PROCEDURE VISIT (OUTPATIENT)
Dept: RESPIRATORY THERAPY | Facility: HOSPITAL | Age: 77
End: 2022-08-09
Attending: FAMILY MEDICINE
Payer: MEDICARE

## 2022-08-09 DIAGNOSIS — R06.02 SHORTNESS OF BREATH: ICD-10-CM

## 2022-08-09 PROCEDURE — 99900031 HC PATIENT EDUCATION (STAT)

## 2022-09-16 ENCOUNTER — HISTORICAL (OUTPATIENT)
Dept: ADMINISTRATIVE | Facility: HOSPITAL | Age: 77
End: 2022-09-16
Payer: MEDICARE

## 2022-10-03 DIAGNOSIS — K21.9 GASTROESOPHAGEAL REFLUX DISEASE, UNSPECIFIED WHETHER ESOPHAGITIS PRESENT: Primary | ICD-10-CM

## 2022-10-03 RX ORDER — ESOMEPRAZOLE MAGNESIUM 40 MG/1
CAPSULE, DELAYED RELEASE ORAL
Qty: 90 CAPSULE | Refills: 1 | Status: SHIPPED | OUTPATIENT
Start: 2022-10-03 | End: 2023-03-29

## 2022-10-12 ENCOUNTER — OFFICE VISIT (OUTPATIENT)
Dept: FAMILY MEDICINE | Facility: CLINIC | Age: 77
End: 2022-10-12
Payer: MEDICARE

## 2022-10-12 VITALS
WEIGHT: 152.19 LBS | HEIGHT: 62 IN | TEMPERATURE: 96 F | DIASTOLIC BLOOD PRESSURE: 69 MMHG | SYSTOLIC BLOOD PRESSURE: 139 MMHG | HEART RATE: 66 BPM | BODY MASS INDEX: 28.01 KG/M2 | OXYGEN SATURATION: 98 % | RESPIRATION RATE: 16 BRPM

## 2022-10-12 DIAGNOSIS — R06.09 DYSPNEA ON EXERTION: Primary | ICD-10-CM

## 2022-10-12 PROCEDURE — 1159F MED LIST DOCD IN RCRD: CPT | Mod: CPTII,,, | Performed by: FAMILY MEDICINE

## 2022-10-12 PROCEDURE — 99213 PR OFFICE/OUTPT VISIT, EST, LEVL III, 20-29 MIN: ICD-10-PCS | Mod: ,,, | Performed by: FAMILY MEDICINE

## 2022-10-12 PROCEDURE — 99213 OFFICE O/P EST LOW 20 MIN: CPT | Mod: ,,, | Performed by: FAMILY MEDICINE

## 2022-10-12 PROCEDURE — 1126F AMNT PAIN NOTED NONE PRSNT: CPT | Mod: CPTII,,, | Performed by: FAMILY MEDICINE

## 2022-10-12 PROCEDURE — 1101F PR PT FALLS ASSESS DOC 0-1 FALLS W/OUT INJ PAST YR: ICD-10-PCS | Mod: CPTII,,, | Performed by: FAMILY MEDICINE

## 2022-10-12 PROCEDURE — 3288F PR FALLS RISK ASSESSMENT DOCUMENTED: ICD-10-PCS | Mod: CPTII,,, | Performed by: FAMILY MEDICINE

## 2022-10-12 PROCEDURE — 1159F PR MEDICATION LIST DOCUMENTED IN MEDICAL RECORD: ICD-10-PCS | Mod: CPTII,,, | Performed by: FAMILY MEDICINE

## 2022-10-12 PROCEDURE — 4010F ACE/ARB THERAPY RXD/TAKEN: CPT | Mod: CPTII,,, | Performed by: FAMILY MEDICINE

## 2022-10-12 PROCEDURE — 1101F PT FALLS ASSESS-DOCD LE1/YR: CPT | Mod: CPTII,,, | Performed by: FAMILY MEDICINE

## 2022-10-12 PROCEDURE — 1160F RVW MEDS BY RX/DR IN RCRD: CPT | Mod: CPTII,,, | Performed by: FAMILY MEDICINE

## 2022-10-12 PROCEDURE — 1126F PR PAIN SEVERITY QUANTIFIED, NO PAIN PRESENT: ICD-10-PCS | Mod: CPTII,,, | Performed by: FAMILY MEDICINE

## 2022-10-12 PROCEDURE — 3288F FALL RISK ASSESSMENT DOCD: CPT | Mod: CPTII,,, | Performed by: FAMILY MEDICINE

## 2022-10-12 PROCEDURE — 1160F PR REVIEW ALL MEDS BY PRESCRIBER/CLIN PHARMACIST DOCUMENTED: ICD-10-PCS | Mod: CPTII,,, | Performed by: FAMILY MEDICINE

## 2022-10-12 PROCEDURE — 4010F PR ACE/ARB THEARPY RXD/TAKEN: ICD-10-PCS | Mod: CPTII,,, | Performed by: FAMILY MEDICINE

## 2022-10-17 ENCOUNTER — DOCUMENTATION ONLY (OUTPATIENT)
Dept: ADMINISTRATIVE | Facility: HOSPITAL | Age: 77
End: 2022-10-17
Payer: MEDICARE

## 2022-10-19 ENCOUNTER — HOSPITAL ENCOUNTER (OUTPATIENT)
Dept: CARDIOLOGY | Facility: HOSPITAL | Age: 77
Discharge: HOME | End: 2022-10-19
Attending: FAMILY MEDICINE
Payer: MEDICARE

## 2022-10-19 DIAGNOSIS — R06.09 DYSPNEA ON EXERTION: ICD-10-CM

## 2022-10-19 LAB
AORTIC ROOT ANNULUS: 3.41 CM
AORTIC VALVE CUSP SEPERATION: 1.04 CM
AV PEAK GRADIENT: 13 MMHG
AV REGURGITATION PRESSURE HALF TIME: 915.38 MS
AV VELOCITY RATIO: 0.69
CV ECHO LV RWT: 0.6 CM
DOP CALC AO PEAK VEL: 1.81 M/S
DOP CALC LVOT AREA: 3.4 CM2
DOP CALC LVOT DIAMETER: 2.07 CM
DOP CALC LVOT PEAK VEL: 1.25 M/S
E WAVE DECELERATION TIME: 202.55 MSEC
E/A RATIO: 0.77
ECHO LV POSTERIOR WALL: 1.06 CM (ref 0.6–1.1)
EJECTION FRACTION: 63 %
FRACTIONAL SHORTENING: 34 % (ref 28–44)
INTERVENTRICULAR SEPTUM: 1.03 CM (ref 0.6–1.1)
LEFT ATRIUM SIZE: 0 CM
LEFT INTERNAL DIMENSION IN SYSTOLE: 2.33 CM (ref 2.1–4)
LEFT VENTRICLE DIASTOLIC VOLUME: 51.6 ML
LEFT VENTRICLE SYSTOLIC VOLUME: 18.67 ML
LEFT VENTRICULAR INTERNAL DIMENSION IN DIASTOLE: 3.52 CM (ref 3.5–6)
LEFT VENTRICULAR MASS: 111.21 G
MV PEAK A VEL: 1.5 M/S
MV PEAK E VEL: 1.15 M/S
MV STENOSIS PRESSURE HALF TIME: 58.74 MS
MV VALVE AREA P 1/2 METHOD: 3.75 CM2
PISA AR MAX VEL: 1.99 M/S
PV PEAK VELOCITY: 1.15 CM/S
RA PRESSURE: 3 MMHG
RIGHT VENTRICULAR END-DIASTOLIC DIMENSION: 2.96 CM
TRICUSPID VALVE PEAK A WAVE VELOCITY: 0.66 M/S
TV PEAK E VEL: 0.6 M/S
TV STENOSIS PRESSURE HALF TIME: 63.73 MS
TV VALVE AREA P 1/2 METHOD: 2.98 CM2

## 2022-10-19 PROCEDURE — 93306 TTE W/DOPPLER COMPLETE: CPT

## 2022-11-09 ENCOUNTER — OFFICE VISIT (OUTPATIENT)
Dept: FAMILY MEDICINE | Facility: CLINIC | Age: 77
End: 2022-11-09
Payer: MEDICARE

## 2022-11-09 VITALS
RESPIRATION RATE: 18 BRPM | WEIGHT: 153.19 LBS | SYSTOLIC BLOOD PRESSURE: 139 MMHG | BODY MASS INDEX: 28.19 KG/M2 | HEIGHT: 62 IN | OXYGEN SATURATION: 98 % | TEMPERATURE: 98 F | DIASTOLIC BLOOD PRESSURE: 66 MMHG | HEART RATE: 67 BPM

## 2022-11-09 DIAGNOSIS — R06.09 DYSPNEA ON EXERTION: Primary | ICD-10-CM

## 2022-11-09 DIAGNOSIS — J30.9 ALLERGIC RHINITIS, UNSPECIFIED SEASONALITY, UNSPECIFIED TRIGGER: ICD-10-CM

## 2022-11-09 PROCEDURE — 1101F PT FALLS ASSESS-DOCD LE1/YR: CPT | Mod: CPTII,,, | Performed by: FAMILY MEDICINE

## 2022-11-09 PROCEDURE — 3075F PR MOST RECENT SYSTOLIC BLOOD PRESS GE 130-139MM HG: ICD-10-PCS | Mod: CPTII,,, | Performed by: FAMILY MEDICINE

## 2022-11-09 PROCEDURE — 99214 OFFICE O/P EST MOD 30 MIN: CPT | Mod: ,,, | Performed by: FAMILY MEDICINE

## 2022-11-09 PROCEDURE — 4010F ACE/ARB THERAPY RXD/TAKEN: CPT | Mod: CPTII,,, | Performed by: FAMILY MEDICINE

## 2022-11-09 PROCEDURE — 3078F DIAST BP <80 MM HG: CPT | Mod: CPTII,,, | Performed by: FAMILY MEDICINE

## 2022-11-09 PROCEDURE — 4010F PR ACE/ARB THEARPY RXD/TAKEN: ICD-10-PCS | Mod: CPTII,,, | Performed by: FAMILY MEDICINE

## 2022-11-09 PROCEDURE — 1126F PR PAIN SEVERITY QUANTIFIED, NO PAIN PRESENT: ICD-10-PCS | Mod: CPTII,,, | Performed by: FAMILY MEDICINE

## 2022-11-09 PROCEDURE — 1126F AMNT PAIN NOTED NONE PRSNT: CPT | Mod: CPTII,,, | Performed by: FAMILY MEDICINE

## 2022-11-09 PROCEDURE — 1160F RVW MEDS BY RX/DR IN RCRD: CPT | Mod: CPTII,,, | Performed by: FAMILY MEDICINE

## 2022-11-09 PROCEDURE — 1101F PR PT FALLS ASSESS DOC 0-1 FALLS W/OUT INJ PAST YR: ICD-10-PCS | Mod: CPTII,,, | Performed by: FAMILY MEDICINE

## 2022-11-09 PROCEDURE — 3288F FALL RISK ASSESSMENT DOCD: CPT | Mod: CPTII,,, | Performed by: FAMILY MEDICINE

## 2022-11-09 PROCEDURE — 3078F PR MOST RECENT DIASTOLIC BLOOD PRESSURE < 80 MM HG: ICD-10-PCS | Mod: CPTII,,, | Performed by: FAMILY MEDICINE

## 2022-11-09 PROCEDURE — 99214 PR OFFICE/OUTPT VISIT, EST, LEVL IV, 30-39 MIN: ICD-10-PCS | Mod: ,,, | Performed by: FAMILY MEDICINE

## 2022-11-09 PROCEDURE — 1160F PR REVIEW ALL MEDS BY PRESCRIBER/CLIN PHARMACIST DOCUMENTED: ICD-10-PCS | Mod: CPTII,,, | Performed by: FAMILY MEDICINE

## 2022-11-09 PROCEDURE — 3288F PR FALLS RISK ASSESSMENT DOCUMENTED: ICD-10-PCS | Mod: CPTII,,, | Performed by: FAMILY MEDICINE

## 2022-11-09 PROCEDURE — 1159F PR MEDICATION LIST DOCUMENTED IN MEDICAL RECORD: ICD-10-PCS | Mod: CPTII,,, | Performed by: FAMILY MEDICINE

## 2022-11-09 PROCEDURE — 3075F SYST BP GE 130 - 139MM HG: CPT | Mod: CPTII,,, | Performed by: FAMILY MEDICINE

## 2022-11-09 PROCEDURE — 1159F MED LIST DOCD IN RCRD: CPT | Mod: CPTII,,, | Performed by: FAMILY MEDICINE

## 2022-11-09 RX ORDER — MONTELUKAST SODIUM 10 MG/1
10 TABLET ORAL DAILY
Qty: 30 TABLET | Refills: 11 | Status: SHIPPED | OUTPATIENT
Start: 2022-11-09 | End: 2023-11-02

## 2022-11-09 RX ORDER — ALBUTEROL SULFATE 90 UG/1
2 AEROSOL, METERED RESPIRATORY (INHALATION) EVERY 4 HOURS PRN
Qty: 18 G | Refills: 1 | Status: SHIPPED | OUTPATIENT
Start: 2022-11-09 | End: 2023-05-08

## 2022-11-09 NOTE — PROGRESS NOTES
Patient ID: [id number]     Chief Complaint: Follow-up        HPI:     Lupis Peguero is a 77 y.o. female here today for Follow-up for dyspnea with exertion. Echocardiogram completed since last appointment. Possible reduced diffusion capacity noted on PFT. Echo largely unremarkable.          ----------------------------  Amnesia  Anxiety disorder, unspecified  GERD (gastroesophageal reflux disease)  HTN (hypertension)  Insomnia  Major depression, recurrent  Mass of right breast  Mixed hyperlipidemia     Past Surgical History:   Procedure Laterality Date    APPENDECTOMY  1959    ASPIRATION OF SYNOVIAL CYST Left 09/01/2016    hand    BACK SURGERY  1996    COLONOSCOPY  10/18/2017    COLONOSCOPY  1997    MASS EXCISION Left 09/01/2016    thigh       Review of patient's allergies indicates:  No Known Allergies    Outpatient Medications Marked as Taking for the 11/9/22 encounter (Office Visit) with Chema Weldon, DO   Medication Sig Dispense Refill    aspirin (ECOTRIN) 81 MG EC tablet Take 81 mg by mouth once daily.      atorvastatin (LIPITOR) 20 MG tablet Take 1 tablet (20 mg total) by mouth once daily. 90 tablet 6    coenzyme Q10 100 mg capsule Take 100 mg by mouth once daily.      docusate sodium (COLACE) 100 MG capsule Take 100 mg by mouth 2 (two) times daily.      esomeprazole (NEXIUM) 40 MG capsule TAKE 1 CAPSULE BY MOUTH EVERY DAY 90 capsule 1    FLUoxetine 10 MG capsule TAKE 1 CAPSULE BY MOUTH EVERY DAY 90 capsule 1    fluticasone propionate (FLONASE) 50 mcg/actuation nasal spray 1 spray by Each Nostril route Daily.      lisinopriL (PRINIVIL,ZESTRIL) 20 MG tablet TAKE 1 TABLET BY MOUTH EVERY DAY 90 tablet 0    melatonin 10 mg Tab Take by mouth.      traZODone (DESYREL) 100 MG tablet TAKE 1 TABLET BY MOUTH EVERYDAY AT BEDTIME 90 tablet 1    vit B-comp w-Fe,Ca,FA<1mg (IRON-VITAMINS ORAL) Take by mouth.      vitamin D (VITAMIN D3) 1000 units Tab Take 1,000 Units by mouth once daily.      [DISCONTINUED]  "montelukast (SINGULAIR) 10 mg tablet Take 1 tablet by mouth Daily.         Social History     Socioeconomic History    Marital status:    Tobacco Use    Smoking status: Never    Smokeless tobacco: Never   Substance and Sexual Activity    Alcohol use: Never    Drug use: Never    Sexual activity: Not Currently        Family History   Problem Relation Age of Onset    Coronary artery disease Mother         CABG    Pulmonary embolism Mother         cause of death    Hypertension Mother     Hyperlipidemia Mother     Diabetes Mellitus Father     Hypertension Father     Hyperlipidemia Father     Heart attacks under age 50 Son 45        cause of death        Patient Care Team:  Chema Weldon DO as PCP - General (Family Medicine)     Subjective:     Review of Systems   Constitutional:  Negative for chills and fever.   Respiratory:  Positive for cough and shortness of breath.         Dyspnea on exertion.    Cardiovascular:  Positive for leg swelling. Negative for chest pain and palpitations.   Neurological:  Negative for dizziness and headaches.     See HPI for details  All Other ROS: Negative except as stated in HPI.       Objective:     /66   Pulse 67   Temp 98.4 °F (36.9 °C) (Tympanic)   Resp 18   Ht 5' 1.81" (1.57 m)   Wt 69.5 kg (153 lb 3.2 oz)   SpO2 98%   BMI 28.19 kg/m²     Physical Exam  Vitals reviewed.   Constitutional:       General: She is not in acute distress.     Appearance: Normal appearance.   Cardiovascular:      Rate and Rhythm: Normal rate and regular rhythm.      Heart sounds: No murmur heard.    No friction rub. No gallop.   Pulmonary:      Effort: No respiratory distress.      Breath sounds: No wheezing, rhonchi or rales.   Skin:     General: Skin is warm and dry.      Findings: No lesion or rash.   Neurological:      General: No focal deficit present.      Mental Status: She is alert.   Psychiatric:         Mood and Affect: Mood normal.       Assessment/Plan:     1. Dyspnea on " exertion  -     albuterol (VENTOLIN HFA) 90 mcg/actuation inhaler; Inhale 2 puffs into the lungs every 4 (four) hours as needed for Wheezing or Shortness of Breath. Rescue  Dispense: 18 g; Refill: 1    2. Allergic rhinitis, unspecified seasonality, unspecified trigger  -     montelukast (SINGULAIR) 10 mg tablet; Take 1 tablet (10 mg total) by mouth once daily.  Dispense: 30 tablet; Refill: 11      -Will trial patient on albuterol inhaler to be used for shortness of breath or wheezing. Will refer to pulmonology if symptoms fail to improve with inhaler or if they begin to worsen.   Follow up:     Follow up in about 7 months (around 6/9/2023). In addition to their scheduled follow up, the patient has also been instructed to follow up on as needed basis.

## 2022-11-24 DIAGNOSIS — F51.01 PRIMARY INSOMNIA: Primary | ICD-10-CM

## 2022-11-28 RX ORDER — TRAZODONE HYDROCHLORIDE 100 MG/1
TABLET ORAL
Qty: 90 TABLET | Refills: 1 | Status: SHIPPED | OUTPATIENT
Start: 2022-11-28 | End: 2023-05-22

## 2022-12-14 ENCOUNTER — DOCUMENTATION ONLY (OUTPATIENT)
Dept: INTERNAL MEDICINE | Facility: CLINIC | Age: 77
End: 2022-12-14
Payer: MEDICARE

## 2023-03-06 ENCOUNTER — OFFICE VISIT (OUTPATIENT)
Dept: FAMILY MEDICINE | Facility: CLINIC | Age: 78
End: 2023-03-06
Payer: MEDICARE

## 2023-03-06 ENCOUNTER — LAB VISIT (OUTPATIENT)
Dept: LAB | Facility: HOSPITAL | Age: 78
End: 2023-03-06
Attending: FAMILY MEDICINE
Payer: MEDICARE

## 2023-03-06 VITALS
BODY MASS INDEX: 27.23 KG/M2 | HEIGHT: 62 IN | WEIGHT: 148 LBS | DIASTOLIC BLOOD PRESSURE: 77 MMHG | SYSTOLIC BLOOD PRESSURE: 144 MMHG | RESPIRATION RATE: 20 BRPM | OXYGEN SATURATION: 99 % | HEART RATE: 60 BPM | TEMPERATURE: 98 F

## 2023-03-06 DIAGNOSIS — K62.5 RECTAL BLEEDING: Primary | ICD-10-CM

## 2023-03-06 DIAGNOSIS — K62.5 RECTAL BLEEDING: ICD-10-CM

## 2023-03-06 LAB
BASOPHILS # BLD AUTO: 0.02 X10(3)/MCL (ref 0–0.2)
BASOPHILS NFR BLD AUTO: 0.4 %
CLOSTRIDIUM DIFFICILE GDH ANTIGEN (OHS): NEGATIVE
CLOSTRIDIUM DIFFICILE TOXIN A/B (OHS): NEGATIVE
EOSINOPHIL # BLD AUTO: 0.08 X10(3)/MCL (ref 0–0.9)
EOSINOPHIL NFR BLD AUTO: 1.7 %
ERYTHROCYTE [DISTWIDTH] IN BLOOD BY AUTOMATED COUNT: 12.3 % (ref 11.5–17)
HCT VFR BLD AUTO: 39.6 % (ref 37–47)
HGB BLD-MCNC: 13.3 G/DL (ref 12–16)
IMM GRANULOCYTES # BLD AUTO: 0.01 X10(3)/MCL (ref 0–0.04)
IMM GRANULOCYTES NFR BLD AUTO: 0.2 %
LYMPHOCYTES # BLD AUTO: 1.46 X10(3)/MCL (ref 0.6–4.6)
LYMPHOCYTES NFR BLD AUTO: 31.8 %
MCH RBC QN AUTO: 31.2 PG
MCHC RBC AUTO-ENTMCNC: 33.6 G/DL (ref 33–36)
MCV RBC AUTO: 93 FL (ref 80–94)
MONOCYTES # BLD AUTO: 0.53 X10(3)/MCL (ref 0.1–1.3)
MONOCYTES NFR BLD AUTO: 11.5 %
NEUTROPHILS # BLD AUTO: 2.49 X10(3)/MCL (ref 2.1–9.2)
NEUTROPHILS NFR BLD AUTO: 54.4 %
NRBC BLD AUTO-RTO: 0 %
OB IA INT NEG CNTRL (OHS): NEGATIVE
OB IA INT POS CNTRL (OHS): POSITIVE
OCCULT BLD IA (OHS): NEGATIVE
PLATELET # BLD AUTO: 239 X10(3)/MCL (ref 130–400)
PMV BLD AUTO: 9.8 FL (ref 7.4–10.4)
RBC # BLD AUTO: 4.26 X10(6)/MCL (ref 4.2–5.4)
WBC # SPEC AUTO: 4.6 X10(3)/MCL (ref 4.5–11.5)

## 2023-03-06 PROCEDURE — 1159F MED LIST DOCD IN RCRD: CPT | Mod: CPTII,,, | Performed by: FAMILY MEDICINE

## 2023-03-06 PROCEDURE — 3078F PR MOST RECENT DIASTOLIC BLOOD PRESSURE < 80 MM HG: ICD-10-PCS | Mod: CPTII,,, | Performed by: FAMILY MEDICINE

## 2023-03-06 PROCEDURE — 1101F PR PT FALLS ASSESS DOC 0-1 FALLS W/OUT INJ PAST YR: ICD-10-PCS | Mod: CPTII,,, | Performed by: FAMILY MEDICINE

## 2023-03-06 PROCEDURE — 1160F PR REVIEW ALL MEDS BY PRESCRIBER/CLIN PHARMACIST DOCUMENTED: ICD-10-PCS | Mod: CPTII,,, | Performed by: FAMILY MEDICINE

## 2023-03-06 PROCEDURE — 99214 PR OFFICE/OUTPT VISIT, EST, LEVL IV, 30-39 MIN: ICD-10-PCS | Mod: ,,, | Performed by: FAMILY MEDICINE

## 2023-03-06 PROCEDURE — 1160F RVW MEDS BY RX/DR IN RCRD: CPT | Mod: CPTII,,, | Performed by: FAMILY MEDICINE

## 2023-03-06 PROCEDURE — 99214 OFFICE O/P EST MOD 30 MIN: CPT | Mod: ,,, | Performed by: FAMILY MEDICINE

## 2023-03-06 PROCEDURE — 85025 COMPLETE CBC W/AUTO DIFF WBC: CPT

## 2023-03-06 PROCEDURE — 3288F PR FALLS RISK ASSESSMENT DOCUMENTED: ICD-10-PCS | Mod: CPTII,,, | Performed by: FAMILY MEDICINE

## 2023-03-06 PROCEDURE — 1126F PR PAIN SEVERITY QUANTIFIED, NO PAIN PRESENT: ICD-10-PCS | Mod: CPTII,,, | Performed by: FAMILY MEDICINE

## 2023-03-06 PROCEDURE — 36415 COLL VENOUS BLD VENIPUNCTURE: CPT

## 2023-03-06 PROCEDURE — 1101F PT FALLS ASSESS-DOCD LE1/YR: CPT | Mod: CPTII,,, | Performed by: FAMILY MEDICINE

## 2023-03-06 PROCEDURE — 3077F PR MOST RECENT SYSTOLIC BLOOD PRESSURE >= 140 MM HG: ICD-10-PCS | Mod: CPTII,,, | Performed by: FAMILY MEDICINE

## 2023-03-06 PROCEDURE — 1159F PR MEDICATION LIST DOCUMENTED IN MEDICAL RECORD: ICD-10-PCS | Mod: CPTII,,, | Performed by: FAMILY MEDICINE

## 2023-03-06 PROCEDURE — 3078F DIAST BP <80 MM HG: CPT | Mod: CPTII,,, | Performed by: FAMILY MEDICINE

## 2023-03-06 PROCEDURE — 1126F AMNT PAIN NOTED NONE PRSNT: CPT | Mod: CPTII,,, | Performed by: FAMILY MEDICINE

## 2023-03-06 PROCEDURE — 3077F SYST BP >= 140 MM HG: CPT | Mod: CPTII,,, | Performed by: FAMILY MEDICINE

## 2023-03-06 PROCEDURE — 3288F FALL RISK ASSESSMENT DOCD: CPT | Mod: CPTII,,, | Performed by: FAMILY MEDICINE

## 2023-03-06 NOTE — PROGRESS NOTES
Patient ID: [id number]     Chief Complaint: Rectal Bleeding (No pain noted. Symptoms started a month ago and worsen with bowel movement.)        HPI:     Lupis Peguero is a 77 y.o. female here today for Rectal Bleeding (No pain noted. Symptoms started a month ago and worsen with bowel movement.). Experienced increased frequency of bowel movements last Friday (2/3/23).       ----------------------------  Amnesia  Anxiety disorder, unspecified  GERD (gastroesophageal reflux disease)  HTN (hypertension)  Insomnia  Major depression, recurrent  Mass of right breast  Mixed hyperlipidemia  Vitamin D deficiency     Past Surgical History:   Procedure Laterality Date    APPENDECTOMY  1959    ASPIRATION OF SYNOVIAL CYST Left 09/01/2016    hand    BACK SURGERY  1996    COLONOSCOPY  10/18/2017    COLONOSCOPY  1997    MASS EXCISION Left 09/01/2016    thigh       Review of patient's allergies indicates:  No Known Allergies    Outpatient Medications Marked as Taking for the 3/6/23 encounter (Office Visit) with Chema Weldon, DO   Medication Sig Dispense Refill    albuterol (VENTOLIN HFA) 90 mcg/actuation inhaler Inhale 2 puffs into the lungs every 4 (four) hours as needed for Wheezing or Shortness of Breath. Rescue 18 g 1    aspirin (ECOTRIN) 81 MG EC tablet Take 81 mg by mouth once daily.      atorvastatin (LIPITOR) 20 MG tablet Take 1 tablet (20 mg total) by mouth once daily. 90 tablet 6    coenzyme Q10 100 mg capsule Take 100 mg by mouth once daily.      esomeprazole (NEXIUM) 40 MG capsule TAKE 1 CAPSULE BY MOUTH EVERY DAY 90 capsule 1    FLUoxetine 10 MG capsule TAKE 1 CAPSULE BY MOUTH EVERY DAY 90 capsule 1    fluticasone propionate (FLONASE) 50 mcg/actuation nasal spray 1 spray by Each Nostril route Daily.      lisinopriL (PRINIVIL,ZESTRIL) 20 MG tablet TAKE 1 TABLET BY MOUTH EVERY DAY 90 tablet 0    melatonin 10 mg Tab Take by mouth.      montelukast (SINGULAIR) 10 mg tablet Take 1 tablet (10 mg total) by mouth  "once daily. 30 tablet 11    traZODone (DESYREL) 100 MG tablet TAKE 1 TABLET BY MOUTH EVERYDAY AT BEDTIME 90 tablet 1    vit B-comp w-Fe,Ca,FA<1mg (IRON-VITAMINS ORAL) Take by mouth.      vitamin D (VITAMIN D3) 1000 units Tab Take 1,000 Units by mouth once daily.         Social History     Socioeconomic History    Marital status:    Tobacco Use    Smoking status: Never    Smokeless tobacco: Never   Substance and Sexual Activity    Alcohol use: Never    Drug use: Never    Sexual activity: Not Currently        Family History   Problem Relation Age of Onset    Coronary artery disease Mother         CABG    Pulmonary embolism Mother         cause of death    Hypertension Mother     Hyperlipidemia Mother     Diabetes Mellitus Father     Hypertension Father     Hyperlipidemia Father     Heart attacks under age 50 Son 45        cause of death        Patient Care Team:  Chema Weldon DO as PCP - General (Family Medicine)  Akiko Butcher MD (Ophthalmology)     Subjective:     Review of Systems   Constitutional:  Negative for chills and fever.   Respiratory:  Negative for shortness of breath.    Cardiovascular:  Negative for chest pain.   Gastrointestinal:  Positive for blood in stool and heartburn. Negative for abdominal pain, constipation, diarrhea, melena and nausea.   Neurological:  Negative for dizziness and headaches.     See HPI for details  All Other ROS: Negative except as stated in HPI.       Objective:     BP (!) 144/77 (BP Location: Left arm, Patient Position: Sitting, BP Method: Large (Automatic))   Pulse 60   Temp 97.8 °F (36.6 °C)   Resp 20   Ht 5' 2" (1.575 m)   Wt 67.1 kg (148 lb)   SpO2 99%   BMI 27.07 kg/m²     Physical Exam  Vitals reviewed.   Constitutional:       General: She is not in acute distress.     Appearance: Normal appearance.   Cardiovascular:      Rate and Rhythm: Normal rate and regular rhythm.      Heart sounds: No murmur heard.    No friction rub. No gallop. "   Pulmonary:      Effort: No respiratory distress.      Breath sounds: No wheezing, rhonchi or rales.   Musculoskeletal:         General: No swelling, tenderness or deformity.      Right lower leg: No edema.      Left lower leg: No edema.   Skin:     General: Skin is warm and dry.      Findings: No lesion or rash.   Neurological:      General: No focal deficit present.      Mental Status: She is alert.   Psychiatric:         Mood and Affect: Mood normal.       Assessment/Plan:     1. Rectal bleeding  -     Ambulatory referral/consult to Gastroenterology; Future; Expected date: 03/06/2023  -     CBC Auto Differential; Future; Expected date: 03/06/2023  -     Calprotectin, Stool; Future; Expected date: 03/06/2023  -     OCCULT BLOOD FECAL IMMUNOASSAY; Future; Expected date: 03/06/2023  -     Stool Culture; Future; Expected date: 03/06/2023  -     Clostridium Diff Toxin, A & B, EIA  -     WBC, Stool; Future; Expected date: 03/06/2023  -     Stool Exam-Ova,Cysts,Parasites; Future; Expected date: 03/06/2023  -     Giardia / Cryptosporidum, EIA; Future; Expected date: 03/06/2023          Follow up:     Follow up if symptoms worsen or fail to improve. In addition to their scheduled follow up, the patient has also been instructed to follow up on as needed basis.

## 2023-03-08 ENCOUNTER — TELEPHONE (OUTPATIENT)
Dept: FAMILY MEDICINE | Facility: CLINIC | Age: 78
End: 2023-03-08
Payer: MEDICARE

## 2023-03-08 LAB
BACTERIA STL CULT: NORMAL
CALPROTECTIN STL-MCNT: <50 MCG/G

## 2023-03-08 NOTE — TELEPHONE ENCOUNTER
----- Message from Chema Weldon DO sent at 3/7/2023  1:15 PM CST -----  All lab results within acceptable ranges.

## 2023-03-16 LAB — O+P STL MICRO: NORMAL

## 2023-03-31 DIAGNOSIS — I10 ESSENTIAL (PRIMARY) HYPERTENSION: ICD-10-CM

## 2023-03-31 RX ORDER — LISINOPRIL 20 MG/1
TABLET ORAL
Qty: 90 TABLET | Refills: 3 | Status: SHIPPED | OUTPATIENT
Start: 2023-03-31 | End: 2023-12-19

## 2023-04-18 ENCOUNTER — TELEPHONE (OUTPATIENT)
Dept: FAMILY MEDICINE | Facility: CLINIC | Age: 78
End: 2023-04-18
Payer: MEDICARE

## 2023-04-18 NOTE — TELEPHONE ENCOUNTER
----- Message from Chema Weldon DO sent at 4/12/2023  3:10 PM CDT -----  All lab results within acceptable ranges.

## 2023-05-08 RX ORDER — TOBRAMYCIN 3 MG/ML
2 SOLUTION/ DROPS OPHTHALMIC 2 TIMES DAILY
COMMUNITY
Start: 2023-04-03 | End: 2023-06-14

## 2023-05-08 RX ORDER — PREDNISOLONE ACETATE 10 MG/ML
2 SUSPENSION/ DROPS OPHTHALMIC 2 TIMES DAILY
COMMUNITY
Start: 2023-04-03 | End: 2023-06-14

## 2023-05-08 RX ORDER — OFLOXACIN 3 MG/ML
0.3 SOLUTION/ DROPS OPHTHALMIC DAILY
COMMUNITY
Start: 2022-11-30 | End: 2023-05-08

## 2023-05-10 ENCOUNTER — ANESTHESIA EVENT (OUTPATIENT)
Dept: SURGERY | Facility: HOSPITAL | Age: 78
End: 2023-05-10
Payer: MEDICARE

## 2023-05-10 NOTE — ANESTHESIA PREPROCEDURE EVALUATION
05/10/2023  Lupis Peguero is a 77 y.o., female.      Pre-op Assessment    I have reviewed the Patient Summary Reports.    I have reviewed the NPO Status.   I have reviewed the Medications.     Review of Systems  Anesthesia Hx:  No problems with previous Anesthesia  Denies Family Hx of Anesthesia complications.   Denies Personal Hx of Anesthesia complications.   Social:  Non-Smoker    Cardiovascular:  Cardiovascular Normal     Pulmonary:  Pulmonary Normal    Renal/:  Renal/ Normal     Hepatic/GI:  Hepatic/GI Normal    Musculoskeletal:  Musculoskeletal Normal    Neurological:  Neurology Normal    Endocrine:  Endocrine Normal    Psych:  Psychiatric Normal           Physical Exam  General: Well nourished, Cooperative, Alert and Oriented    Airway:  Mallampati: I   Mouth Opening: Normal  TM Distance: Normal  Tongue: Normal  Neck ROM: Normal ROM    Dental:  Intact    Chest/Lungs:  Normal Respiratory Rate    Heart:  Rate: Normal    Musculoskeletal:Normal mobility      Anesthesia Plan  Type of Anesthesia, risks & benefits discussed:    Anesthesia Type: MAC  Intra-op Monitoring Plan: Standard ASA Monitors  Post Op Pain Control Plan: multimodal analgesia  Induction:  IV  Informed Consent: Informed consent signed with the Patient and all parties understand the risks and agree with anesthesia plan.  All questions answered.   ASA Score: 2  Day of Surgery Review of History & Physical: H&P Update referred to the surgeon/provider.  Anesthesia Plan Notes: Anesthesia plan was discussed with patient and/or representative. Risks and alternatives were discussed including the possibility of alteration of plan.     Ready For Surgery From Anesthesia Perspective.     .

## 2023-05-11 ENCOUNTER — HOSPITAL ENCOUNTER (OUTPATIENT)
Facility: HOSPITAL | Age: 78
Discharge: HOME | End: 2023-05-11
Attending: INTERNAL MEDICINE | Admitting: INTERNAL MEDICINE
Payer: MEDICARE

## 2023-05-11 ENCOUNTER — ANESTHESIA (OUTPATIENT)
Dept: SURGERY | Facility: HOSPITAL | Age: 78
End: 2023-05-11
Payer: MEDICARE

## 2023-05-11 DIAGNOSIS — K92.1 BLOOD IN STOOL: Primary | ICD-10-CM

## 2023-05-11 LAB — CRC RECOMMENDATION EXT: NORMAL

## 2023-05-11 PROCEDURE — 25000003 PHARM REV CODE 250: Performed by: NURSE ANESTHETIST, CERTIFIED REGISTERED

## 2023-05-11 PROCEDURE — 00811 ANES LWR INTST NDSC NOS: CPT | Mod: QZ,P2,QS | Performed by: NURSE ANESTHETIST, CERTIFIED REGISTERED

## 2023-05-11 PROCEDURE — 37000009 HC ANESTHESIA EA ADD 15 MINS: Performed by: INTERNAL MEDICINE

## 2023-05-11 PROCEDURE — 45398 COLONOSCOPY W/BAND LIGATION: CPT | Mod: 59 | Performed by: INTERNAL MEDICINE

## 2023-05-11 PROCEDURE — 37000008 HC ANESTHESIA 1ST 15 MINUTES: Performed by: INTERNAL MEDICINE

## 2023-05-11 PROCEDURE — 63600175 PHARM REV CODE 636 W HCPCS: Performed by: NURSE ANESTHETIST, CERTIFIED REGISTERED

## 2023-05-11 PROCEDURE — 27201423 OPTIME MED/SURG SUP & DEVICES STERILE SUPPLY: Performed by: INTERNAL MEDICINE

## 2023-05-11 PROCEDURE — 88305 TISSUE EXAM BY PATHOLOGIST: CPT | Performed by: INTERNAL MEDICINE

## 2023-05-11 PROCEDURE — 45385 COLONOSCOPY W/LESION REMOVAL: CPT | Performed by: INTERNAL MEDICINE

## 2023-05-11 PROCEDURE — D9220AH HC ANESTHESIA PROFESSIONAL FEE: Mod: QZ,P2,QS | Performed by: NURSE ANESTHETIST, CERTIFIED REGISTERED

## 2023-05-11 RX ORDER — PROPOFOL 10 MG/ML
VIAL (ML) INTRAVENOUS
Status: DISCONTINUED | OUTPATIENT
Start: 2023-05-11 | End: 2023-05-11

## 2023-05-11 RX ORDER — SODIUM CHLORIDE 9 MG/ML
INJECTION, SOLUTION INTRAVENOUS CONTINUOUS
Status: ACTIVE | OUTPATIENT
Start: 2023-05-11

## 2023-05-11 RX ORDER — ONDANSETRON 2 MG/ML
INJECTION INTRAMUSCULAR; INTRAVENOUS
Status: DISCONTINUED | OUTPATIENT
Start: 2023-05-11 | End: 2023-05-11

## 2023-05-11 RX ORDER — LIDOCAINE HYDROCHLORIDE 10 MG/ML
INJECTION, SOLUTION EPIDURAL; INFILTRATION; INTRACAUDAL; PERINEURAL
Status: DISCONTINUED | OUTPATIENT
Start: 2023-05-11 | End: 2023-05-11

## 2023-05-11 RX ADMIN — LIDOCAINE HYDROCHLORIDE 20 MG: 10 INJECTION, SOLUTION EPIDURAL; INFILTRATION; INTRACAUDAL; PERINEURAL at 09:05

## 2023-05-11 RX ADMIN — SODIUM CHLORIDE: 9 INJECTION, SOLUTION INTRAVENOUS at 08:05

## 2023-05-11 RX ADMIN — ONDANSETRON HYDROCHLORIDE 4 MG: 2 SOLUTION INTRAMUSCULAR; INTRAVENOUS at 09:05

## 2023-05-11 RX ADMIN — PROPOFOL 100 MG: 10 INJECTION, EMULSION INTRAVENOUS at 09:05

## 2023-05-11 NOTE — ANESTHESIA POSTPROCEDURE EVALUATION
Anesthesia Post Evaluation    Patient: Lupis Peguero    Procedure(s) Performed: Procedure(s) (LRB):  COLONOSCOPY (N/A)  LIGATION, HEMORRHOIDS (N/A)    Final Anesthesia Type: MAC      Patient location during evaluation: floor  Patient participation: Yes- Able to Participate  Level of consciousness: awake and alert  Post-procedure vital signs: reviewed and stable  Pain management: adequate  Airway patency: patent    PONV status at discharge: No PONV  Anesthetic complications: no      Cardiovascular status: blood pressure returned to baseline  Respiratory status: unassisted  Hydration status: euvolemic  Follow-up not needed.          Vitals Value Taken Time   /48 05/11/23 0753   Temp 36.5 °C (97.7 °F) 05/11/23 0753   Pulse 60 05/11/23 0753   Resp 20 05/11/23 0753   SpO2 98 % 05/11/23 0753         No case tracking events are documented in the log.      Pain/Anna Score: No data recorded

## 2023-05-11 NOTE — DISCHARGE SUMMARY
[facility]  Discharge Note  Short Stay    Procedure(s) (LRB):  COLONOSCOPY (N/A)  LIGATION, HEMORRHOIDS (N/A)      OUTCOME: Patient tolerated treatment/procedure well without complication and is now ready for discharge.    DISPOSITION: Home or Self Care    FINAL DIAGNOSIS:  Hemorrhoids responsible for bleeding    FOLLOWUP: In clinic    DISCHARGE INSTRUCTIONS:  Make plans for in office hemorrhoid ligation in 1 month      Clinical Reference Documents Added to Patient Instructions         Document    COLONOSCOPY DISCHARGE INSTRUCTIONS (ENGLISH)    HEMORRHOID BANDING (ENGLISH)            TIME SPENT ON DISCHARGE:  10 minutes

## 2023-05-11 NOTE — DISCHARGE SUMMARY
[facility]  Discharge Note  Short Stay    Procedure(s) (LRB):  COLONOSCOPY (N/A)  LIGATION, HEMORRHOIDS (N/A)      OUTCOME: Patient tolerated treatment/procedure well without complication and is now ready for discharge.    DISPOSITION: Home or Self Care    FINAL DIAGNOSIS:  Bleeding secondary to internal hemorrhoids, colon polyp, diverticulosis coli    FOLLOWUP: In clinic    DISCHARGE INSTRUCTIONS:  The patient has been given discharge instructions and we will make plans to repeat hemorrhoidal banding in 4 weeks as an outpatient.  No further plans for surveillance colonoscopy      TIME SPENT ON DISCHARGE:  15 minutes

## 2023-05-11 NOTE — OP NOTE
[facility]  Colon Procedure  Operative Note    SUMMARY     Date of Procedure: 2023     Procedure: Procedure(s) (LRB):  COLONOSCOPY (N/A)  LIGATION, HEMORRHOIDS (N/A)    Surgeon(s) and Role:     * Han Brown MD - Primary    Assisting Surgeon: None    Patient Name: Lupis Peguero      Patient location: OPS    Patient : 1945    Patient Gender: female    Referring Physician: Chema Weldon     Anesthesia Provider: Antonieta Burns CRNA     Pre-Operative Diagnosis: Blood in stool [K92.1]    Post-Operative Diagnosis: Post-Op Diagnosis Codes:     * Blood in stool [K92.1]       Indications:  Rectal bleeding      Procedure:   This is a patient undergoing a diagnostic colonoscopy. Prior colonoscopy was preformed on  with no significant findings. The procedure, indications, preparation and potential complications were explained to the patient, who indicated understanding and signed the corresponding consent forms. Patient identifications and proposed procedure were verified by the physician, the nurse and the anesthesia staff in the endoscopy suite. IV sedation was administered by the nurse anesthetist. Continuous pulse oximetry and blood pressure monitoring were used throughout the procedure. Supplemental oxygen was used. The quality of preparation was excellent. Patient was placed in left lateral decubitus position. Digital  exam was remarkable for internal hemorrhoids with slight prolapse.. The colonoscope was introduced through the rectum and advanced under direct visualization until cecum reached. The appendiceal orifice and the ileocecal valve were identified. The terminal ileum was identified. Patience tolerance to procedure was good. The procedure was not difficult.      Limitations/Complications:  None  Complications: No    Findings:                 Colon:  Colonoscopy was carried out without difficulty.  The cecum contained a 3 mm sessile polyp.  There were  diverticula noted in the transverse descending and sigmoid colon but no evidence of diverticulitis.  The patient did have hemorrhoids internal with slight prolapse.  Otherwise examination of the colonic mucosa was unremarkable.  The ileocecal valve and cecal cap were identified.    Other Interventions:  Polypectomy was carried out with cold snare technique with the polyp being retrieved for histological evaluation.  Hemorrhoidal banding was carried out without difficulty with banding of the left lateral hemorrhoidal plexus.    Diagnostic Impression:  Rectal bleeding secondary to internal hemorrhoids, colon polyp, diverticulosis coli.    Assessment & Plan:  Check histology on the polyp and as long as no high-grade dysplasia to recommend no further colonic surveillance.  We will recommend repeat banding in 4 weeks and [place] by nurse practitioner Alma.    Additional Notes:  None                Han Brown MD  5/11/2023

## 2023-05-11 NOTE — H&P
[facility]  Gastroenterology  H&P    Patient Name: Lupis Peguero  MRN: [id number]  Admission Date: 5/11/2023  Code Status: Full Code    Attending Provider: Han Brown MD   Primary Care Physician: Chema Weldon DO  Principal Problem:<principal problem not specified>    Subjective:     History of Present Illness:  This patient is being seen in consultation from Dr. Chema Weldon for evaluation of rectal bleeding and increased stooling.  Last colonoscopy was 2017.  The patient is not on any anticoagulants.  Patient denies constipation diarrhea or melena.      Past Medical History:   Diagnosis Date    Amnesia     Anxiety disorder, unspecified     GERD (gastroesophageal reflux disease)     HTN (hypertension)     Insomnia     Major depression, recurrent     Mass of right breast     Mixed hyperlipidemia     Vitamin D deficiency        Past Surgical History:   Procedure Laterality Date    APPENDECTOMY  1959    ASPIRATION OF SYNOVIAL CYST Left 09/01/2016    hand    BACK SURGERY  1996    COLONOSCOPY  10/18/2017    COLONOSCOPY  1997    EYE SURGERY Bilateral     CATARACT    MASS EXCISION Left 09/01/2016    thigh       Review of patient's allergies indicates:  No Known Allergies  Family History       Problem Relation (Age of Onset)    Coronary artery disease Mother    Diabetes Mellitus Father    Heart attacks under age 50 Son (45)    Hyperlipidemia Mother, Father    Hypertension Mother, Father    Pulmonary embolism Mother          Tobacco Use    Smoking status: Never    Smokeless tobacco: Never   Substance and Sexual Activity    Alcohol use: Never    Drug use: Never    Sexual activity: Not Currently     Review of Systems  Objective:  Positive for lower GI complaints otherwise unremarkable.     Vital Signs (Most Recent):    Vital Signs (24h Range):        Weight: 68 kg (150 lb) (05/08/23 1013)  Body mass index is 27.44 kg/m².    No intake or output data in the 24 hours ending  05/11/23 0749    Lines/Drains/Airways       None                   Physical Exam  Constitutional:       Appearance: Normal appearance.   HENT:      Head: Normocephalic.      Nose: Nose normal.   Eyes:      Extraocular Movements: Extraocular movements intact.   Cardiovascular:      Rate and Rhythm: Normal rate.   Pulmonary:      Effort: Pulmonary effort is normal.   Abdominal:      General: Abdomen is flat.      Palpations: Abdomen is soft.   Musculoskeletal:         General: Normal range of motion.      Cervical back: Normal range of motion.   Skin:     General: Skin is warm.   Neurological:      General: No focal deficit present.      Mental Status: She is alert.   Psychiatric:         Mood and Affect: Mood normal.       Significant Labs:  CBC: No results for input(s): WBC, HGB, HCT, PLT in the last 48 hours.    Significant Imaging:  None    Assessment/Plan:     There are no hospital problems to display for this patient.      Rectal bleeding to be evaluated by colonoscopy.    Han Brown MD  Gastroenterology  [facility]

## 2023-05-11 NOTE — DISCHARGE INSTRUCTIONS
No driving today    Dr. Brown's office will contact you with follow up and results from today's procedure    No heavy lifting or straining

## 2023-05-15 LAB — PSYCHE PATHOLOGY RESULT: NORMAL

## 2023-05-16 ENCOUNTER — DOCUMENTATION ONLY (OUTPATIENT)
Dept: FAMILY MEDICINE | Facility: CLINIC | Age: 78
End: 2023-05-16
Payer: MEDICARE

## 2023-05-18 VITALS
BODY MASS INDEX: 27.05 KG/M2 | RESPIRATION RATE: 20 BRPM | DIASTOLIC BLOOD PRESSURE: 64 MMHG | HEART RATE: 58 BPM | OXYGEN SATURATION: 99 % | HEIGHT: 62 IN | SYSTOLIC BLOOD PRESSURE: 171 MMHG | WEIGHT: 147 LBS | TEMPERATURE: 100 F

## 2023-05-21 DIAGNOSIS — F51.01 PRIMARY INSOMNIA: ICD-10-CM

## 2023-05-22 RX ORDER — TRAZODONE HYDROCHLORIDE 100 MG/1
TABLET ORAL
Qty: 90 TABLET | Refills: 5 | Status: SHIPPED | OUTPATIENT
Start: 2023-05-22

## 2023-06-14 ENCOUNTER — OFFICE VISIT (OUTPATIENT)
Dept: FAMILY MEDICINE | Facility: CLINIC | Age: 78
End: 2023-06-14
Payer: MEDICARE

## 2023-06-14 VITALS
WEIGHT: 151 LBS | DIASTOLIC BLOOD PRESSURE: 72 MMHG | HEIGHT: 62 IN | HEART RATE: 75 BPM | RESPIRATION RATE: 18 BRPM | TEMPERATURE: 98 F | BODY MASS INDEX: 27.79 KG/M2 | OXYGEN SATURATION: 97 % | SYSTOLIC BLOOD PRESSURE: 137 MMHG

## 2023-06-14 DIAGNOSIS — I10 PRIMARY HYPERTENSION: ICD-10-CM

## 2023-06-14 DIAGNOSIS — F33.0 MILD EPISODE OF RECURRENT MAJOR DEPRESSIVE DISORDER: ICD-10-CM

## 2023-06-14 DIAGNOSIS — Z00.00 ROUTINE GENERAL MEDICAL EXAMINATION AT A HEALTH CARE FACILITY: Primary | ICD-10-CM

## 2023-06-14 DIAGNOSIS — E78.2 MIXED HYPERLIPIDEMIA: ICD-10-CM

## 2023-06-14 PROCEDURE — G0439 PR MEDICARE ANNUAL WELLNESS SUBSEQUENT VISIT: ICD-10-PCS | Mod: ,,, | Performed by: FAMILY MEDICINE

## 2023-06-14 PROCEDURE — 3075F SYST BP GE 130 - 139MM HG: CPT | Mod: CPTII,,, | Performed by: FAMILY MEDICINE

## 2023-06-14 PROCEDURE — 3288F PR FALLS RISK ASSESSMENT DOCUMENTED: ICD-10-PCS | Mod: CPTII,,, | Performed by: FAMILY MEDICINE

## 2023-06-14 PROCEDURE — 1126F PR PAIN SEVERITY QUANTIFIED, NO PAIN PRESENT: ICD-10-PCS | Mod: CPTII,,, | Performed by: FAMILY MEDICINE

## 2023-06-14 PROCEDURE — 3078F PR MOST RECENT DIASTOLIC BLOOD PRESSURE < 80 MM HG: ICD-10-PCS | Mod: CPTII,,, | Performed by: FAMILY MEDICINE

## 2023-06-14 PROCEDURE — 1159F PR MEDICATION LIST DOCUMENTED IN MEDICAL RECORD: ICD-10-PCS | Mod: CPTII,,, | Performed by: FAMILY MEDICINE

## 2023-06-14 PROCEDURE — 1101F PT FALLS ASSESS-DOCD LE1/YR: CPT | Mod: CPTII,,, | Performed by: FAMILY MEDICINE

## 2023-06-14 PROCEDURE — 3075F PR MOST RECENT SYSTOLIC BLOOD PRESS GE 130-139MM HG: ICD-10-PCS | Mod: CPTII,,, | Performed by: FAMILY MEDICINE

## 2023-06-14 PROCEDURE — 1160F PR REVIEW ALL MEDS BY PRESCRIBER/CLIN PHARMACIST DOCUMENTED: ICD-10-PCS | Mod: CPTII,,, | Performed by: FAMILY MEDICINE

## 2023-06-14 PROCEDURE — 1101F PR PT FALLS ASSESS DOC 0-1 FALLS W/OUT INJ PAST YR: ICD-10-PCS | Mod: CPTII,,, | Performed by: FAMILY MEDICINE

## 2023-06-14 PROCEDURE — 3288F FALL RISK ASSESSMENT DOCD: CPT | Mod: CPTII,,, | Performed by: FAMILY MEDICINE

## 2023-06-14 PROCEDURE — 1126F AMNT PAIN NOTED NONE PRSNT: CPT | Mod: CPTII,,, | Performed by: FAMILY MEDICINE

## 2023-06-14 PROCEDURE — G0439 PPPS, SUBSEQ VISIT: HCPCS | Mod: ,,, | Performed by: FAMILY MEDICINE

## 2023-06-14 PROCEDURE — 1159F MED LIST DOCD IN RCRD: CPT | Mod: CPTII,,, | Performed by: FAMILY MEDICINE

## 2023-06-14 PROCEDURE — 1160F RVW MEDS BY RX/DR IN RCRD: CPT | Mod: CPTII,,, | Performed by: FAMILY MEDICINE

## 2023-06-14 PROCEDURE — 3078F DIAST BP <80 MM HG: CPT | Mod: CPTII,,, | Performed by: FAMILY MEDICINE

## 2023-06-14 NOTE — PROGRESS NOTES
Patient ID: [id number]     Chief Complaint: Medicare AWV        HPI:     Lupis Peguero is a 77 y.o. female here today for a Medicare Wellness.         ----------------------------  Amnesia  Anxiety disorder, unspecified  GERD (gastroesophageal reflux disease)  HTN (hypertension)  Insomnia  Major depression, recurrent  Mass of right breast  Mixed hyperlipidemia  Personal history of colonic polyps      Comment:  s/p polypectomy - Dr Han Brown  Vitamin D deficiency     Past Surgical History:   Procedure Laterality Date    APPENDECTOMY  1959    ASPIRATION OF SYNOVIAL CYST Left 09/01/2016    hand    BACK SURGERY  1996    COLONOSCOPY  10/18/2017    COLONOSCOPY  1997    COLONOSCOPY N/A 05/11/2023    Dr Han Brown    EYE SURGERY Bilateral     CATARACT    LIGATION, HEMORRHOIDS N/A 05/11/2023    Dr Han Brown    MASS EXCISION Left 09/01/2016    thigh       Review of patient's allergies indicates:  No Known Allergies    Outpatient Medications Marked as Taking for the 6/14/23 encounter (Office Visit) with Chema Weldon, DO   Medication Sig Dispense Refill    aspirin (ECOTRIN) 81 MG EC tablet Take 81 mg by mouth once daily.      atorvastatin (LIPITOR) 20 MG tablet Take 1 tablet (20 mg total) by mouth once daily. 90 tablet 6    coenzyme Q10 100 mg capsule Take 100 mg by mouth once daily.      esomeprazole (NEXIUM) 40 MG capsule TAKE 1 CAPSULE BY MOUTH EVERY DAY 90 capsule 1    FLUoxetine 10 MG capsule TAKE 1 CAPSULE BY MOUTH EVERY DAY 90 capsule 1    fluticasone propionate (FLONASE) 50 mcg/actuation nasal spray 1 spray by Each Nostril route Daily.      lisinopriL (PRINIVIL,ZESTRIL) 20 MG tablet TAKE 1 TABLET BY MOUTH EVERY DAY 90 tablet 3    melatonin 10 mg Tab Take by mouth.      montelukast (SINGULAIR) 10 mg tablet Take 1 tablet (10 mg total) by mouth once daily. 30 tablet 11    traZODone (DESYREL) 100 MG tablet TAKE 1 TABLET BY MOUTH EVERYDAY AT BEDTIME 90 tablet 5    vit B-comp w-Fe,Ca,FA<1mg  (IRON-VITAMINS ORAL) Take by mouth.      vitamin D (VITAMIN D3) 1000 units Tab Take 1,000 Units by mouth once daily.         Social History     Socioeconomic History    Marital status:    Tobacco Use    Smoking status: Never    Smokeless tobacco: Never   Substance and Sexual Activity    Alcohol use: Never    Drug use: Never    Sexual activity: Not Currently     Social Determinants of Health     Financial Resource Strain: Low Risk     Difficulty of Paying Living Expenses: Not hard at all   Food Insecurity: No Food Insecurity    Worried About Running Out of Food in the Last Year: Never true    Ran Out of Food in the Last Year: Never true   Transportation Needs: No Transportation Needs    Lack of Transportation (Medical): No    Lack of Transportation (Non-Medical): No   Physical Activity: Sufficiently Active    Days of Exercise per Week: 6 days    Minutes of Exercise per Session: 30 min   Stress: Stress Concern Present    Feeling of Stress : To some extent   Social Connections: Moderately Integrated    Frequency of Communication with Friends and Family: More than three times a week    Frequency of Social Gatherings with Friends and Family: More than three times a week    Attends Judaism Services: More than 4 times per year    Active Member of Clubs or Organizations: Yes    Attends Club or Organization Meetings: More than 4 times per year    Marital Status:    Housing Stability: Low Risk     Unable to Pay for Housing in the Last Year: No    Number of Places Lived in the Last Year: 1    Unstable Housing in the Last Year: No        Family History   Problem Relation Age of Onset    Coronary artery disease Mother         CABG    Pulmonary embolism Mother         cause of death    Hypertension Mother     Hyperlipidemia Mother     Diabetes Mellitus Father     Hypertension Father     Hyperlipidemia Father     Heart attacks under age 50 Son 45        cause of death        Patient Care Team:  Chema Weldon DO  as PCP - General (Family Medicine)  Akiko Butcher MD (Ophthalmology)  Han Brown MD as Consulting Physician (Gastroenterology)       Subjective:     Review of Systems   Constitutional:  Negative for chills and fever.   Respiratory:  Negative for shortness of breath.    Cardiovascular:  Negative for chest pain.   Gastrointestinal:  Negative for constipation and diarrhea.   Neurological:  Negative for dizziness and headaches.   Psychiatric/Behavioral:  Negative for depression. The patient is not nervous/anxious and does not have insomnia.        Patient Reported Health Risk Assessments:  What [age] 70-79  Are you male or female?: Female  During the past four weeks, how much have you been bothered by emotional problems such as feeling anxious, depressed, irritable, sad, or downhearted and blue?: Slightly  During the past five weeks, has your physical and/or emotional health limited your social activities with family, friends, neighbors, or groups?: Not at all  During the past four weeks, how much bodily pain have you generally had?: Mild pain  During the past four weeks, was someone available to help if you needed and wanted help?: Yes, as much as I wanted  During the past four weeks, what was the hardest physical activity you could do for at least two minutes?: Moderate  Can you get to places out of walking distance without help?  (For example, can you travel alone on buses or taxis, or drive your own car?): Yes  Can you go shopping for groceries or clothes without someone's help?: Yes  Can you prepare your own meals?: Yes  Can you do your own housework without help?: Yes  Because of any health problems, do you need the help of another person with your personal care needs such as eating, bathing, dressing, or getting around the house?: No  Can you handle your own money without help?: Yes  During the past four weeks, how would you rate your health in general?: Very good  How have things been  "going for you during the past four weeks?: Very well  Are you having difficulties driving your car?: No  Do you always fasten your seat belt when you are in a car?: Yes, usually  How often in the past four weeks have you been bothered by falling or dizzy when standing up?: Never  How often in the past four weeks have you been bothered by sexual problems?: Never  How often in the past four weeks have you been bothered by trouble eating well?: Never  How often in the past four weeks have you been bothered by teeth or denture problems?: Never  How often in the past four weeks have you been bothered with problems using the telephone?: Never  How often in the past four weeks have you been bothered by tiredness or fatigue?: Seldom  Have you fallen two or more times in the past year?: No  Are you afraid of falling?: No  Are you a smoker?: No  During the past four weeks, how many drinks of wine, beer, or other alcoholic beverages did you have?: One drink or less per week  Do you exercise for about 20 minutes three or more days a week?: Yes, most of the time  Have you been given any information to help you with hazards in your house that might hurt you?: Yes  Have you been given any information to help you with keeping track of your medications?: Yes  How often do you have trouble taking medicines the way you've been told to take them?: I always take them as prescribed  How confident are you that you can control and manage most of your health problems?: Very confident  What is your race? (Check all that apply.):     Objective:     /72   Pulse 75   Temp 97.7 °F (36.5 °C) (Tympanic)   Resp 18   Ht 5' 1.81" (1.57 m)   Wt 68.5 kg (151 lb)   SpO2 97%   BMI 27.79 kg/m²     Physical Exam  Vitals reviewed.   Constitutional:       General: She is not in acute distress.     Appearance: Normal appearance.   Cardiovascular:      Rate and Rhythm: Normal rate and regular rhythm.      Heart sounds: No murmur heard.    " No friction rub. No gallop.   Pulmonary:      Effort: No respiratory distress.      Breath sounds: No wheezing, rhonchi or rales.   Musculoskeletal:         General: No swelling, tenderness or deformity.      Right lower leg: No edema.      Left lower leg: No edema.   Skin:     General: Skin is warm and dry.      Findings: No lesion or rash.   Neurological:      General: No focal deficit present.      Mental Status: She is alert.   Psychiatric:         Mood and Affect: Mood normal.           Assessment:       ICD-10-CM ICD-9-CM   1. Routine general medical examination at a health care facility  Z00.00 V70.0   2. Mild episode of recurrent major depressive disorder  F33.0 296.31        Plan:       Medicare Annual Wellness and Personalized Prevention Plan:     Fall Risk + Home Safety + Living Situation + Whisper Test + Depression Screen + CAGE + Cognitive Impairment Screen + ADL Screen + Timed Get Up and Go + Nutrition Screen + PAQ Screen + Health Risk Assessment all reviewed.     Checklist of Activities of Daily Living 6/7/2022   Bathing Independent   Dressing Independent   Grooming Independent   Oral Care Independent   Toileting Independent   Transferring Independent   Walking Independent   Climbing Stairs Independent   Eating Independent   Shopping Independent   Cooking Independent   Managing Medications Independent   Using the Phone Independent   Housework Indpendent   Laundry Independent   Driving Independent   Managing Finances Independent     Fall Risk Assessment - Outpatient 6/14/2023 3/6/2023 11/9/2022 10/12/2022 6/7/2022   Mobility Status Ambulatory Ambulatory Ambulatory Ambulatory Ambulatory   Number of falls 0 0 0 0 0   Identified as fall risk 0 0 0 0 0                   Depression Screening  Over the past two weeks, has the patient felt down, depressed, or hopeless?: No  Over the past two weeks, has the patient felt little interest or pleasure in doing things?: No  Functional Ability/Safety Screening  Was  the patient's timed Up & Go test unsteady or longer than 30 seconds?: No  Does the patient need help with phone, transportation, shopping, preparing meals, housework, laundry, meds, or managing money?: No  Does the patient's home have rugs in the hallway, lack grab bars in the bathroom, lack handrails on the stairs or have poor lighting?: No  Have you noticed any hearing difficulties?: No  Cognitive Function (Assessed through direct observation with due consideration of information obtained by way of patient reports and/or concerns raised by family, friends, caretakers, or others)    Does the patient repeat questions/statements in the same day?: No  Does the patient have trouble remembering the date, year, and time?: No  Does the patient have difficulty managing finances?: No  Does the patient have a decreased sense of direction?: No      Offer of free  was accepted or rejected?: rejected  If  rejected, why?: Patient states understands English    Alcohol/Tobacco Use - Stressed importance of smoking cessation and limiting alcohol intake.  CVD Risk Factors - Reviewed  Obesity/Physical Activity - Normal BMI. Encouraged daily 30 minute physical activity x 5 days per week.    Opioid Screening: Patient medication list reviewed, patient is not taking prescription opioids. Patient is not using additional opioids than prescribed. Patient is at low risk of substance abuse based on this opioid use history.        Health Maintenance Topics with due status: Not Due       Topic Last Completion Date    DEXA Scan 06/04/2021    Lipid Panel 06/08/2022    Colorectal Cancer Screening 05/11/2023    Influenza Vaccine Not Due          Breast Cancer Screening - Patient now 77, per USPFTF guidelines, screening stops at 74.   Colon Cancer Screening - Colonoscopy on 5/11/23.   Osteoporosis Screening - Last DEXA on 6/4/21. Next due 6/4/24.   Eye Exam - Recommend annually   Dental Exam - Recommend  biannually  Vaccinations -   Immunization History   Administered Date(s) Administered    COVID-19, MRNA, LN-S, PF (Pfizer) (Purple Cap) 08/03/2021, 08/25/2021    Pneumococcal Conjugate - 13 Valent 06/03/2021    Pneumococcal Polysaccharide - 23 Valent 02/27/2017        Advance Care Planning   Advanced Care Planning: I offered to discuss Advanced Care Planning, which consists of how you would like to be cared for as you end the near of your natural life.  This includes how to pick a person who would make decisions for you if you were unable to make them for yourself, which is called a Medical Power of . These discussions include what kind of decisions you will make regarding life sustaining measures, such as ventilators and feeding tubes, when faced with a life limiting illness recorded on a living will that they will need to know.  Spent 20 minutes with the patient/family on the importance of Advanced Care Planning.          1. Routine general medical examination at a health care facility    2. Mild episode of recurrent major depressive disorder  well controlled on current prescription medication        Medication List with Changes/Refills   Current Medications    ASPIRIN (ECOTRIN) 81 MG EC TABLET    Take 81 mg by mouth once daily.       Start Date: --        End Date: --    ATORVASTATIN (LIPITOR) 20 MG TABLET    Take 1 tablet (20 mg total) by mouth once daily.       Start Date: 6/10/2022 End Date: 6/14/2023    COENZYME Q10 100 MG CAPSULE    Take 100 mg by mouth once daily.       Start Date: --        End Date: --    ESOMEPRAZOLE (NEXIUM) 40 MG CAPSULE    TAKE 1 CAPSULE BY MOUTH EVERY DAY       Start Date: 3/29/2023 End Date: --    FLUOXETINE 10 MG CAPSULE    TAKE 1 CAPSULE BY MOUTH EVERY DAY       Start Date: 2/3/2023  End Date: --    FLUTICASONE PROPIONATE (FLONASE) 50 MCG/ACTUATION NASAL SPRAY    1 spray by Each Nostril route Daily.       Start Date: 12/6/2021 End Date: --    LISINOPRIL (PRINIVIL,ZESTRIL)  20 MG TABLET    TAKE 1 TABLET BY MOUTH EVERY DAY       Start Date: 3/31/2023 End Date: --    MELATONIN 10 MG TAB    Take by mouth.       Start Date: --        End Date: --    MONTELUKAST (SINGULAIR) 10 MG TABLET    Take 1 tablet (10 mg total) by mouth once daily.       Start Date: 11/9/2022 End Date: --    TRAZODONE (DESYREL) 100 MG TABLET    TAKE 1 TABLET BY MOUTH EVERYDAY AT BEDTIME       Start Date: 5/22/2023 End Date: --    VIT B-COMP W-FE,CA,FA<1MG (IRON-VITAMINS ORAL)    Take by mouth.       Start Date: --        End Date: --    VITAMIN D (VITAMIN D3) 1000 UNITS TAB    Take 1,000 Units by mouth once daily.       Start Date: --        End Date: --   Discontinued Medications    PREDNISOLONE ACETATE (PRED FORTE) 1 % DRPS    Place 2 drops into both eyes 2 (two) times a day.       Start Date: 4/3/2023  End Date: 6/14/2023    TOBRAMYCIN SULFATE 0.3% (TOBREX) 0.3 % OPHTHALMIC SOLUTION    Place 2 drops into both eyes 2 (two) times a day.       Start Date: 4/3/2023  End Date: 6/14/2023          The patient's Health Maintenance was reviewed and the following appears to be due at this time:   Health Maintenance Due   Topic Date Due    Hepatitis C Screening  Never done    TETANUS VACCINE  Never done    Shingles Vaccine (1 of 2) Never done    COVID-19 Vaccine (3 - Pfizer series) 10/20/2021    Mammogram  12/14/2022           Follow up in about 6 months (around 12/14/2023) for Follow up. In addition to their scheduled follow up, the patient has also been instructed to follow up on as needed basis.     Provided patient with a 5-10 year written screening schedule and personal prevention plan. Recommendations were developed using the USPSTF age appropriate recommendations. Education, counseling, and referrals were provided as needed. After Visit Summary printed and given to patient which includes a list of additional screenings\tests needed.

## 2023-06-15 ENCOUNTER — LAB VISIT (OUTPATIENT)
Dept: LAB | Facility: HOSPITAL | Age: 78
End: 2023-06-15
Attending: FAMILY MEDICINE
Payer: MEDICARE

## 2023-06-15 DIAGNOSIS — E78.2 MIXED HYPERLIPIDEMIA: ICD-10-CM

## 2023-06-15 DIAGNOSIS — I10 PRIMARY HYPERTENSION: ICD-10-CM

## 2023-06-15 DIAGNOSIS — Z00.00 ROUTINE GENERAL MEDICAL EXAMINATION AT A HEALTH CARE FACILITY: ICD-10-CM

## 2023-06-15 LAB
ALBUMIN SERPL-MCNC: 3.9 G/DL (ref 3.4–4.8)
ALBUMIN/GLOB SERPL: 1.3 RATIO (ref 1.1–2)
ALP SERPL-CCNC: 70 UNIT/L (ref 40–150)
ALT SERPL-CCNC: 28 UNIT/L (ref 0–55)
AST SERPL-CCNC: 27 UNIT/L (ref 5–34)
BASOPHILS # BLD AUTO: 0.03 X10(3)/MCL
BASOPHILS NFR BLD AUTO: 0.5 %
BILIRUBIN DIRECT+TOT PNL SERPL-MCNC: 0.5 MG/DL
BUN SERPL-MCNC: 16 MG/DL (ref 9.8–20.1)
CALCIUM SERPL-MCNC: 9.7 MG/DL (ref 8.4–10.2)
CHLORIDE SERPL-SCNC: 108 MMOL/L (ref 98–107)
CHOLEST SERPL-MCNC: 164 MG/DL
CHOLEST/HDLC SERPL: 3 {RATIO} (ref 0–5)
CO2 SERPL-SCNC: 30 MMOL/L (ref 23–31)
CREAT SERPL-MCNC: 0.82 MG/DL (ref 0.55–1.02)
EOSINOPHIL # BLD AUTO: 0.16 X10(3)/MCL (ref 0–0.9)
EOSINOPHIL NFR BLD AUTO: 2.6 %
ERYTHROCYTE [DISTWIDTH] IN BLOOD BY AUTOMATED COUNT: 11.8 % (ref 11.5–17)
GFR SERPLBLD CREATININE-BSD FMLA CKD-EPI: >60 MLS/MIN/1.73/M2
GLOBULIN SER-MCNC: 3 GM/DL (ref 2.4–3.5)
GLUCOSE SERPL-MCNC: 108 MG/DL (ref 82–115)
HCT VFR BLD AUTO: 40.2 % (ref 37–47)
HDLC SERPL-MCNC: 48 MG/DL (ref 35–60)
HGB BLD-MCNC: 13.6 G/DL (ref 12–16)
IMM GRANULOCYTES # BLD AUTO: 0.01 X10(3)/MCL (ref 0–0.04)
IMM GRANULOCYTES NFR BLD AUTO: 0.2 %
LDLC SERPL CALC-MCNC: 88 MG/DL (ref 50–140)
LYMPHOCYTES # BLD AUTO: 1.82 X10(3)/MCL (ref 0.6–4.6)
LYMPHOCYTES NFR BLD AUTO: 29.8 %
MCH RBC QN AUTO: 31 PG (ref 27–31)
MCHC RBC AUTO-ENTMCNC: 33.8 G/DL (ref 33–36)
MCV RBC AUTO: 91.6 FL (ref 80–94)
MONOCYTES # BLD AUTO: 0.54 X10(3)/MCL (ref 0.1–1.3)
MONOCYTES NFR BLD AUTO: 8.8 %
NEUTROPHILS # BLD AUTO: 3.55 X10(3)/MCL (ref 2.1–9.2)
NEUTROPHILS NFR BLD AUTO: 58.1 %
NRBC BLD AUTO-RTO: 0 %
PLATELET # BLD AUTO: 216 X10(3)/MCL (ref 130–400)
PMV BLD AUTO: 8.7 FL (ref 7.4–10.4)
POTASSIUM SERPL-SCNC: 4.4 MMOL/L (ref 3.5–5.1)
PROT SERPL-MCNC: 6.9 GM/DL (ref 5.8–7.6)
RBC # BLD AUTO: 4.39 X10(6)/MCL (ref 4.2–5.4)
SODIUM SERPL-SCNC: 144 MMOL/L (ref 136–145)
TRIGL SERPL-MCNC: 138 MG/DL (ref 37–140)
VLDLC SERPL CALC-MCNC: 28 MG/DL
WBC # SPEC AUTO: 6.11 X10(3)/MCL (ref 4.5–11.5)

## 2023-06-15 PROCEDURE — 80061 LIPID PANEL: CPT

## 2023-06-15 PROCEDURE — 80053 COMPREHEN METABOLIC PANEL: CPT

## 2023-06-15 PROCEDURE — 36415 COLL VENOUS BLD VENIPUNCTURE: CPT

## 2023-06-15 PROCEDURE — 85025 COMPLETE CBC W/AUTO DIFF WBC: CPT

## 2023-07-11 ENCOUNTER — TELEPHONE (OUTPATIENT)
Dept: FAMILY MEDICINE | Facility: CLINIC | Age: 78
End: 2023-07-11
Payer: MEDICARE

## 2023-07-11 NOTE — TELEPHONE ENCOUNTER
----- Message from Karol Butcher sent at 7/11/2023  8:17 AM CDT -----  Regarding: results  Patient did labs 1 month ago, would like results    Thanks, Lamar

## 2023-08-03 DIAGNOSIS — F41.9 ANXIETY: ICD-10-CM

## 2023-08-03 RX ORDER — FLUOXETINE 10 MG/1
CAPSULE ORAL
Qty: 90 CAPSULE | Refills: 1 | Status: SHIPPED | OUTPATIENT
Start: 2023-08-03 | End: 2024-01-29

## 2023-08-31 DIAGNOSIS — E78.2 MIXED HYPERLIPIDEMIA: Primary | ICD-10-CM

## 2023-09-05 RX ORDER — ATORVASTATIN CALCIUM 20 MG/1
20 TABLET, FILM COATED ORAL
Qty: 90 TABLET | Refills: 3 | Status: SHIPPED | OUTPATIENT
Start: 2023-09-05

## 2023-09-23 DIAGNOSIS — K21.9 GASTROESOPHAGEAL REFLUX DISEASE, UNSPECIFIED WHETHER ESOPHAGITIS PRESENT: ICD-10-CM

## 2023-09-25 RX ORDER — ESOMEPRAZOLE MAGNESIUM 40 MG/1
CAPSULE, DELAYED RELEASE ORAL
Qty: 90 CAPSULE | Refills: 1 | Status: SHIPPED | OUTPATIENT
Start: 2023-09-25 | End: 2023-12-19

## 2023-11-02 DIAGNOSIS — J30.9 ALLERGIC RHINITIS, UNSPECIFIED SEASONALITY, UNSPECIFIED TRIGGER: ICD-10-CM

## 2023-11-02 RX ORDER — MONTELUKAST SODIUM 10 MG/1
10 TABLET ORAL
Qty: 90 TABLET | Refills: 3 | Status: SHIPPED | OUTPATIENT
Start: 2023-11-02

## 2023-12-19 ENCOUNTER — OFFICE VISIT (OUTPATIENT)
Dept: FAMILY MEDICINE | Facility: CLINIC | Age: 78
End: 2023-12-19
Payer: MEDICARE

## 2023-12-19 VITALS
TEMPERATURE: 98 F | HEART RATE: 64 BPM | DIASTOLIC BLOOD PRESSURE: 58 MMHG | BODY MASS INDEX: 27.45 KG/M2 | OXYGEN SATURATION: 99 % | SYSTOLIC BLOOD PRESSURE: 162 MMHG | RESPIRATION RATE: 20 BRPM | WEIGHT: 149.19 LBS | HEIGHT: 62 IN

## 2023-12-19 DIAGNOSIS — I10 PRIMARY HYPERTENSION: Primary | ICD-10-CM

## 2023-12-19 DIAGNOSIS — K21.9 GASTROESOPHAGEAL REFLUX DISEASE, UNSPECIFIED WHETHER ESOPHAGITIS PRESENT: ICD-10-CM

## 2023-12-19 DIAGNOSIS — F33.2 SEVERE EPISODE OF RECURRENT MAJOR DEPRESSIVE DISORDER, WITHOUT PSYCHOTIC FEATURES: ICD-10-CM

## 2023-12-19 PROCEDURE — 3077F SYST BP >= 140 MM HG: CPT | Mod: CPTII,,,

## 2023-12-19 PROCEDURE — 1159F MED LIST DOCD IN RCRD: CPT | Mod: CPTII,,,

## 2023-12-19 PROCEDURE — 1160F PR REVIEW ALL MEDS BY PRESCRIBER/CLIN PHARMACIST DOCUMENTED: ICD-10-PCS | Mod: CPTII,,,

## 2023-12-19 PROCEDURE — 1126F PR PAIN SEVERITY QUANTIFIED, NO PAIN PRESENT: ICD-10-PCS | Mod: CPTII,,,

## 2023-12-19 PROCEDURE — 3078F PR MOST RECENT DIASTOLIC BLOOD PRESSURE < 80 MM HG: ICD-10-PCS | Mod: CPTII,,,

## 2023-12-19 PROCEDURE — 3288F PR FALLS RISK ASSESSMENT DOCUMENTED: ICD-10-PCS | Mod: CPTII,,,

## 2023-12-19 PROCEDURE — 3077F PR MOST RECENT SYSTOLIC BLOOD PRESSURE >= 140 MM HG: ICD-10-PCS | Mod: CPTII,,,

## 2023-12-19 PROCEDURE — 3288F FALL RISK ASSESSMENT DOCD: CPT | Mod: CPTII,,,

## 2023-12-19 PROCEDURE — 1159F PR MEDICATION LIST DOCUMENTED IN MEDICAL RECORD: ICD-10-PCS | Mod: CPTII,,,

## 2023-12-19 PROCEDURE — 1101F PT FALLS ASSESS-DOCD LE1/YR: CPT | Mod: CPTII,,,

## 2023-12-19 PROCEDURE — 3078F DIAST BP <80 MM HG: CPT | Mod: CPTII,,,

## 2023-12-19 PROCEDURE — 99214 OFFICE O/P EST MOD 30 MIN: CPT | Mod: ,,,

## 2023-12-19 PROCEDURE — 1126F AMNT PAIN NOTED NONE PRSNT: CPT | Mod: CPTII,,,

## 2023-12-19 PROCEDURE — 99214 PR OFFICE/OUTPT VISIT, EST, LEVL IV, 30-39 MIN: ICD-10-PCS | Mod: ,,,

## 2023-12-19 PROCEDURE — 1160F RVW MEDS BY RX/DR IN RCRD: CPT | Mod: CPTII,,,

## 2023-12-19 PROCEDURE — 1101F PR PT FALLS ASSESS DOC 0-1 FALLS W/OUT INJ PAST YR: ICD-10-PCS | Mod: CPTII,,,

## 2023-12-19 RX ORDER — LISINOPRIL 20 MG/1
40 TABLET ORAL DAILY
Qty: 90 TABLET | Refills: 3 | Status: SHIPPED | OUTPATIENT
Start: 2023-12-19 | End: 2024-06-16

## 2023-12-19 RX ORDER — OMEPRAZOLE 40 MG/1
40 CAPSULE, DELAYED RELEASE ORAL DAILY
Qty: 30 CAPSULE | Refills: 11 | Status: SHIPPED | OUTPATIENT
Start: 2023-12-19 | End: 2024-12-18

## 2023-12-19 NOTE — ASSESSMENT & PLAN NOTE
Elevated today in clinic.   ECHO ordered.   Increase Lisinopril to 40 mg daily.   Increase fluid intake.   RTC in two weeks for a BP evaluation.   Low Sodium Diet (DASH Diet - Less than 2 grams of sodium per day).  Monitor blood pressure daily and log. Report consistent numbers greater than 140/90.  Maintain healthy weight with goal BMI <30. Exercise 30 minutes per day, 5 days per week.

## 2023-12-19 NOTE — ASSESSMENT & PLAN NOTE
Stop Nexium 40 mg daily.   Start Omeprazole 40 mg daily.   Avoid spicy, acidic, fried foods and alcohol.  Eat 2-3 hours before going to bed.  Avoid tight clothing, chew food thoroughly.  Reduce caffeine intake, avoid soda.

## 2023-12-19 NOTE — PROGRESS NOTES
Patient ID: [id number]     Chief Complaint: Follow-up (6 month check up), Depression, Gastroesophageal Reflux (Nexium not covered under insurance requesting omeprazole prescription), and Hypertension      HPI:     Lupis Peguero is a 78 y.o. female here today for a sixth month follow up for GERD, depression, and HTN. Blood pressure is elevated today. The patient does not check blood pressure at home. She is compliant with her medications.     Past Medical History:   Diagnosis Date    Amnesia     Anxiety disorder, unspecified     GERD (gastroesophageal reflux disease)     HTN (hypertension)     Insomnia     Major depression, recurrent     Mass of right breast     Mixed hyperlipidemia     Personal history of colonic polyps 05/11/2023    s/p polypectomy - Dr Han Brown    Vitamin D deficiency         Past Surgical History:   Procedure Laterality Date    APPENDECTOMY  1959    ASPIRATION OF SYNOVIAL CYST Left 09/01/2016    hand    BACK SURGERY  1996    COLONOSCOPY  10/18/2017    COLONOSCOPY  1997    COLONOSCOPY N/A 05/11/2023    Dr Han Brown    EYE SURGERY Bilateral     CATARACT    LIGATION, HEMORRHOIDS N/A 05/11/2023    Dr Han Brown    MASS EXCISION Left 09/01/2016    thigh        Social History     Socioeconomic History    Marital status:    Tobacco Use    Smoking status: Never    Smokeless tobacco: Never   Substance and Sexual Activity    Alcohol use: Never    Drug use: Never    Sexual activity: Not Currently     Social Determinants of Health     Financial Resource Strain: Low Risk  (6/14/2023)    Overall Financial Resource Strain (CARDIA)     Difficulty of Paying Living Expenses: Not hard at all   Food Insecurity: No Food Insecurity (6/14/2023)    Hunger Vital Sign     Worried About Running Out of Food in the Last Year: Never true     Ran Out of Food in the Last Year: Never true   Transportation Needs: No Transportation Needs (6/14/2023)    PRAPARE - Transportation     Lack of  Transportation (Medical): No     Lack of Transportation (Non-Medical): No   Physical Activity: Sufficiently Active (6/14/2023)    Exercise Vital Sign     Days of Exercise per Week: 6 days     Minutes of Exercise per Session: 30 min   Stress: Stress Concern Present (6/14/2023)    [place] [employer] - Occupational Stress Questionnaire     Feeling of Stress : To some extent   Social Connections: Moderately Integrated (6/14/2023)    Social Connection and Isolation Panel [NHANES]     Frequency of Communication with Friends and Family: More than three times a week     Frequency of Social Gatherings with Friends and Family: More than three times a week     Attends [employer] Services: More than 4 times per year     Active Member of Clubs or Organizations: Yes     Attends Club or Organization Meetings: More than 4 times per year     Marital Status:    Housing Stability: Low Risk  (6/14/2023)    Housing Stability Vital Sign     Unable to Pay for Housing in the Last Year: No     Number of Places Lived in the Last Year: 1     Unstable Housing in the Last Year: No        Current Outpatient Medications   Medication Instructions    aspirin (ECOTRIN) 81 mg, Oral, Daily    atorvastatin (LIPITOR) 20 mg, Oral    coenzyme Q10 100 mg, Oral, Daily    FLUoxetine 10 MG capsule TAKE 1 CAPSULE BY MOUTH EVERY DAY    fluticasone propionate (FLONASE) 50 mcg/actuation nasal spray 1 spray, Each Nostril, Daily    lisinopriL (PRINIVIL,ZESTRIL) 40 mg, Oral, Daily    melatonin 10 mg Tab Oral    montelukast (SINGULAIR) 10 mg, Oral    omeprazole (PRILOSEC) 40 mg, Oral, Daily    traZODone (DESYREL) 100 MG tablet TAKE 1 TABLET BY MOUTH EVERYDAY AT BEDTIME    vit B-comp w-Fe,Ca,FA<1mg (IRON-VITAMINS ORAL) Oral    vitamin D (VITAMIN D3) 1,000 Units, Oral, Daily       Review of patient's allergies indicates:  No Known Allergies     Patient Care Team:  Chema Weldon DO as PCP - General (Family Medicine)  Akiko Butcher,  "MD (Ophthalmology)  Han Brown MD as Consulting Physician (Gastroenterology)     Subjective:     Review of Systems    12 point review of systems conducted, negative except as stated in the history of present illness. See HPI for details.    Objective:     Visit Vitals  BP (!) 162/58 (BP Location: Right arm, Patient Position: Sitting, BP Method: Large (Automatic))   Pulse 64   Temp 97.6 °F (36.4 °C)   Resp 20   Ht 5' 2" (1.575 m)   Wt 67.7 kg (149 lb 3.2 oz)   SpO2 99%   BMI 27.29 kg/m²       Physical Exam  Vitals and nursing note reviewed.   Constitutional:       General: She is not in acute distress.     Appearance: She is not ill-appearing.   HENT:      Head: Normocephalic and atraumatic.      Mouth/Throat:      Mouth: Mucous membranes are moist.      Pharynx: Oropharynx is clear.   Eyes:      General: No scleral icterus.     Extraocular Movements: Extraocular movements intact.      Conjunctiva/sclera: Conjunctivae normal.      Pupils: Pupils are equal, round, and reactive to light.   Neck:      Vascular: No carotid bruit.   Cardiovascular:      Rate and Rhythm: Normal rate and regular rhythm.      Heart sounds: No murmur heard.     No friction rub. No gallop.   Pulmonary:      Effort: Pulmonary effort is normal. No respiratory distress.      Breath sounds: Normal breath sounds. No wheezing, rhonchi or rales.   Abdominal:      General: Abdomen is flat. Bowel sounds are normal. There is no distension.      Palpations: Abdomen is soft. There is no mass.      Tenderness: There is no abdominal tenderness.   Musculoskeletal:         General: Normal range of motion.      Cervical back: Normal range of motion and neck supple.   Skin:     General: Skin is warm and dry.   Neurological:      General: No focal deficit present.      Mental Status: She is alert.   Psychiatric:         Mood and Affect: Mood normal.         Labs Reviewed:     Chemistry:  Lab Results   Component Value Date     06/15/2023    K 4.4 " 06/15/2023    CHLORIDE 108 (H) 06/15/2023    BUN 16.0 06/15/2023    CREATININE 0.82 06/15/2023    EGFRNORACEVR >60 06/15/2023    GLUCOSE 108 06/15/2023    CALCIUM 9.7 06/15/2023    ALKPHOS 70 06/15/2023    LABPROT 6.9 06/15/2023    ALBUMIN 3.9 06/15/2023    BILIDIR 0.2 04/28/2021    IBILI 0.20 04/28/2021    AST 27 06/15/2023    ALT 28 06/15/2023    [id number] 81.9 (H) 06/08/2022    TSH 1.185 04/05/2019      Hematology:  Lab Results   Component Value Date    WBC 6.11 06/15/2023    HGB 13.6 06/15/2023    HCT 40.2 06/15/2023     06/15/2023     Lipid Panel:  Lab Results   Component Value Date    CHOL 164 06/15/2023    HDL 48 06/15/2023    LDL 88.00 06/15/2023    TRIG 138 06/15/2023    TOTALCHOLEST 3 06/15/2023      Urine:  Lab Results   Component Value Date    COLORUA Yellow 06/08/2022    APPEARANCEUA Clear 06/08/2022    SGUA 1.020 06/08/2022    PHUA 6.5 06/08/2022    PROTEINUA Negative 06/08/2022    GLUCOSEUA Negative 06/08/2022    KETONESUA Negative 06/08/2022    BLOODUA 1+ (A) 06/08/2022    NITRITESUA Negative 06/08/2022    LEUKOCYTESUR 2+ (A) 06/08/2022    RBCUA 3-5 06/08/2022    WBCUA 0-2 06/08/2022    BACTERIA Moderate (A) 06/08/2022        Assessment:       ICD-10-CM ICD-9-CM   1. Primary hypertension  I10 401.9   2. Gastroesophageal reflux disease, unspecified whether esophagitis present  K21.9 530.81   3. Severe episode of recurrent major depressive disorder, without psychotic features  F33.2 296.33        Plan:     1. Primary hypertension  Assessment & Plan:  Elevated today in clinic.   ECHO ordered.   Increase Lisinopril to 40 mg daily.   Increase fluid intake.   RTC in two weeks for a BP evaluation.   Low Sodium Diet (DASH Diet - Less than 2 grams of sodium per day).  Monitor blood pressure daily and log. Report consistent numbers greater than 140/90.  Maintain healthy weight with goal BMI <30. Exercise 30 minutes per day, 5 days per week.      Orders:  -     Echo; Future; Expected date:  12/19/2023  -     lisinopriL (PRINIVIL,ZESTRIL) 20 MG tablet; Take 2 tablets (40 mg total) by mouth once daily.  Dispense: 90 tablet; Refill: 3    2. Gastroesophageal reflux disease, unspecified whether esophagitis present  Assessment & Plan:  Stop Nexium 40 mg daily.   Start Omeprazole 40 mg daily.   Avoid spicy, acidic, fried foods and alcohol.  Eat 2-3 hours before going to bed.  Avoid tight clothing, chew food thoroughly.  Reduce caffeine intake, avoid soda.        Orders:  -     omeprazole (PRILOSEC) 40 MG capsule; Take 1 capsule (40 mg total) by mouth once daily.  Dispense: 30 capsule; Refill: 11    3. Severe episode of recurrent major depressive disorder, without psychotic features  Assessment & Plan:  Stable.   Continue Fluoxetine 10 mg + Trazodone 100 mg daily.   Educated patient on the risks of serotonin based medications such as serotonin modulators and SSRIs/SNRIs including common side effects of nausea, GI upset, headache dizziness as well as the rare risk for worsening symptoms of depression including development of suicidal thoughts or ideations, and serotonin syndrome.   Exercise daily. Get sunlight daily.  Practice positive phrases and repeat throughout the day, along with yoga and relaxation techniques.  Establish good social support, make changes to reduce stress.  Reports any symptoms of suicidal/homicidal ideations or self harm immediately, if clinic is closed go to nearest emergency room.        Follow up in about 2 weeks (around 1/2/2024) for HTN. In addition to their scheduled follow up, the patient has also been instructed to follow up on as needed basis.     Trang Kay NP

## 2023-12-19 NOTE — ASSESSMENT & PLAN NOTE
Stable.   Continue Fluoxetine 10 mg + Trazodone 100 mg daily.   Educated patient on the risks of serotonin based medications such as serotonin modulators and SSRIs/SNRIs including common side effects of nausea, GI upset, headache dizziness as well as the rare risk for worsening symptoms of depression including development of suicidal thoughts or ideations, and serotonin syndrome.   Exercise daily. Get sunlight daily.  Practice positive phrases and repeat throughout the day, along with yoga and relaxation techniques.  Establish good social support, make changes to reduce stress.  Reports any symptoms of suicidal/homicidal ideations or self harm immediately, if clinic is closed go to nearest emergency room.

## 2023-12-22 ENCOUNTER — HOSPITAL ENCOUNTER (OUTPATIENT)
Dept: RADIOLOGY | Facility: HOSPITAL | Age: 78
Discharge: HOME | End: 2023-12-22
Payer: MEDICARE

## 2023-12-22 DIAGNOSIS — I10 PRIMARY HYPERTENSION: ICD-10-CM

## 2023-12-22 LAB
AORTIC ROOT ANNULUS: 3.53 CM
AORTIC VALVE CUSP SEPERATION: 1.53 CM
AV PEAK GRADIENT: 15 MMHG
AV REGURGITATION PRESSURE HALF TIME: 824.5 MS
AV VELOCITY RATIO: 0.77
CV ECHO LV RWT: 0.43 CM
DOP CALC AO PEAK VEL: 1.95 M/S
DOP CALC LVOT PEAK VEL: 1.5 M/S
E WAVE DECELERATION TIME: 202.81 MSEC
E/A RATIO: 0.74
ECHO LV POSTERIOR WALL: 1 CM (ref 0.6–1.1)
FRACTIONAL SHORTENING: 32 % (ref 28–44)
INTERVENTRICULAR SEPTUM: 0.51 CM (ref 0.6–1.1)
LEFT ATRIUM SIZE: 0 CM
LEFT INTERNAL DIMENSION IN SYSTOLE: 3.15 CM (ref 2.1–4)
LEFT VENTRICLE DIASTOLIC VOLUME: 100.31 ML
LEFT VENTRICLE SYSTOLIC VOLUME: 39.36 ML
LEFT VENTRICULAR INTERNAL DIMENSION IN DIASTOLE: 4.66 CM (ref 3.5–6)
LEFT VENTRICULAR MASS: 111.83 G
MV PEAK A VEL: 1.43 M/S
MV PEAK E VEL: 1.06 M/S
MV STENOSIS PRESSURE HALF TIME: 58.81 MS
MV VALVE AREA P 1/2 METHOD: 3.74 CM2
OHS LV EJECTION FRACTION SIMPSONS BIPLANE MOD: 60 %
PISA AR MAX VEL: 1.56 M/S
PV PEAK GRADIENT: 4 MMHG
PV PEAK VELOCITY: 1.02 M/S
RIGHT VENTRICULAR END-DIASTOLIC DIMENSION: 2.61 CM
TRICUSPID VALVE PEAK A WAVE VELOCITY: 0.41 M/S
TV PEAK E VEL: 0.4 M/S
TV STENOSIS PRESSURE HALF TIME: 70.3 MS
TV VALVE AREA P 1/2 METHOD: 2.7 CM2

## 2023-12-22 PROCEDURE — 93306 TTE W/DOPPLER COMPLETE: CPT

## 2023-12-26 ENCOUNTER — TELEPHONE (OUTPATIENT)
Dept: FAMILY MEDICINE | Facility: CLINIC | Age: 78
End: 2023-12-26
Payer: MEDICARE

## 2023-12-26 NOTE — TELEPHONE ENCOUNTER
----- Message from Trang Kay NP sent at 12/22/2023 11:27 AM CST -----  Please inform patient of lab results.     1. Mild Left ventricle hypertrophy which can be caused by chronically increased workload of the heart. EF 55-60 which is normal. Grade I diastolic dysfunction. Trace pulmonic valve regurgitation. Mild aortic valve regurgitation. I would like to refer the patient to cardiology for further evaluation. If she agrees, please put the order in.   Thanks for all you do,   Trang

## 2023-12-27 ENCOUNTER — TELEPHONE (OUTPATIENT)
Dept: FAMILY MEDICINE | Facility: CLINIC | Age: 78
End: 2023-12-27
Payer: MEDICARE

## 2023-12-27 DIAGNOSIS — I35.1 MILD AORTIC REGURGITATION: ICD-10-CM

## 2023-12-27 DIAGNOSIS — I10 PRIMARY HYPERTENSION: Primary | ICD-10-CM

## 2023-12-27 DIAGNOSIS — I37.1 PULMONARY VALVE INSUFFICIENCY, UNSPECIFIED ETIOLOGY: ICD-10-CM

## 2024-01-03 ENCOUNTER — OFFICE VISIT (OUTPATIENT)
Dept: FAMILY MEDICINE | Facility: CLINIC | Age: 79
End: 2024-01-03
Payer: MEDICARE

## 2024-01-03 VITALS
WEIGHT: 147.19 LBS | TEMPERATURE: 98 F | HEART RATE: 76 BPM | HEIGHT: 62 IN | DIASTOLIC BLOOD PRESSURE: 54 MMHG | SYSTOLIC BLOOD PRESSURE: 133 MMHG | RESPIRATION RATE: 20 BRPM | BODY MASS INDEX: 27.08 KG/M2 | OXYGEN SATURATION: 96 %

## 2024-01-03 DIAGNOSIS — I10 PRIMARY HYPERTENSION: Primary | ICD-10-CM

## 2024-01-03 DIAGNOSIS — J01.00 ACUTE NON-RECURRENT MAXILLARY SINUSITIS: ICD-10-CM

## 2024-01-03 PROCEDURE — 99214 OFFICE O/P EST MOD 30 MIN: CPT | Mod: ,,,

## 2024-01-03 RX ORDER — FLUTICASONE PROPIONATE 50 MCG
1 SPRAY, SUSPENSION (ML) NASAL DAILY
Qty: 9.9 ML | Refills: 2 | Status: SHIPPED | OUTPATIENT
Start: 2024-01-03

## 2024-01-03 RX ORDER — METHYLPREDNISOLONE 4 MG/1
TABLET ORAL
Qty: 1 EACH | Refills: 0 | Status: SHIPPED | OUTPATIENT
Start: 2024-01-03

## 2024-01-03 RX ORDER — AMOXICILLIN AND CLAVULANATE POTASSIUM 875; 125 MG/1; MG/1
1 TABLET, FILM COATED ORAL EVERY 12 HOURS
Qty: 14 TABLET | Refills: 0 | Status: SHIPPED | OUTPATIENT
Start: 2024-01-03 | End: 2024-01-10

## 2024-01-03 NOTE — PROGRESS NOTES
Patient ID: [id number]     Chief Complaint: Follow-up (2 week check up for hypertension/Has not been monitoring bp), Cough, sinus congestion, ear congestion, Sore Throat, and Hoarse      HPI:     Lupis Peguero is a 78 y.o. female here today for a HTN follow up. She is not monitoring her BP at home, but she is taking the Lisinopril 40 mg daily.   She is also having sinus congestion, ear congestion, sore throat and is hoarse today. She is having clear drainage from her nose. She states that symptoms started 4 days ago. She has not taken anything OTC.     Past Medical History:   Diagnosis Date    Amnesia     Anxiety disorder, unspecified     GERD (gastroesophageal reflux disease)     HTN (hypertension)     Insomnia     Major depression, recurrent     Mass of right breast     Mixed hyperlipidemia     Personal history of colonic polyps 05/11/2023    s/p polypectomy - Dr Han Brown    Vitamin D deficiency         Past Surgical History:   Procedure Laterality Date    APPENDECTOMY  1959    ASPIRATION OF SYNOVIAL CYST Left 09/01/2016    hand    BACK SURGERY  1996    COLONOSCOPY  10/18/2017    COLONOSCOPY  1997    COLONOSCOPY N/A 05/11/2023    Dr Han Brown    EYE SURGERY Bilateral     CATARACT    LIGATION, HEMORRHOIDS N/A 05/11/2023    Dr Han Brown    MASS EXCISION Left 09/01/2016    thigh        Social History     Socioeconomic History    Marital status:    Tobacco Use    Smoking status: Never    Smokeless tobacco: Never   Substance and Sexual Activity    Alcohol use: Never    Drug use: Never    Sexual activity: Not Currently     Social Determinants of Health     Financial Resource Strain: Low Risk  (6/14/2023)    Overall Financial Resource Strain (CARDIA)     Difficulty of Paying Living Expenses: Not hard at all   Food Insecurity: No Food Insecurity (6/14/2023)    Hunger Vital Sign     Worried About Running Out of Food in the Last Year: Never true     Ran Out of Food in the Last Year:  Never true   Transportation Needs: No Transportation Needs (6/14/2023)    PRAPARE - Transportation     Lack of Transportation (Medical): No     Lack of Transportation (Non-Medical): No   Physical Activity: Sufficiently Active (6/14/2023)    Exercise Vital Sign     Days of Exercise per Week: 6 days     Minutes of Exercise per Session: 30 min   Stress: Stress Concern Present (6/14/2023)    [place] [employer]ational [employer] - Occupational Stress Questionnaire     Feeling of Stress : To some extent   Social Connections: Moderately Integrated (6/14/2023)    Social Connection and Isolation Panel [NHANES]     Frequency of Communication with Friends and Family: More than three times a week     Frequency of Social Gatherings with Friends and Family: More than three times a week     Attends [employer] Services: More than 4 times per year     Active Member of Clubs or Organizations: Yes     Attends Club or Organization Meetings: More than 4 times per year     Marital Status:    Housing Stability: Low Risk  (6/14/2023)    Housing Stability Vital Sign     Unable to Pay for Housing in the Last Year: No     Number of Places Lived in the Last Year: 1     Unstable Housing in the Last Year: No        Current Outpatient Medications   Medication Instructions    amoxicillin-clavulanate 875-125mg (AUGMENTIN) 875-125 mg per tablet 1 tablet, Oral, Every 12 hours    aspirin (ECOTRIN) 81 mg, Oral, Daily    atorvastatin (LIPITOR) 20 mg, Oral    coenzyme Q10 100 mg, Oral, Daily    FLUoxetine 10 MG capsule TAKE 1 CAPSULE BY MOUTH EVERY DAY    fluticasone propionate (FLONASE) 50 mcg, Each Nostril, Daily    lisinopriL (PRINIVIL,ZESTRIL) 40 mg, Oral, Daily    melatonin 10 mg Tab Oral    methylPREDNISolone (MEDROL DOSEPACK) 4 mg tablet use as directed    montelukast (SINGULAIR) 10 mg, Oral    omeprazole (PRILOSEC) 40 mg, Oral, Daily    traZODone (DESYREL) 100 MG tablet TAKE 1 TABLET BY MOUTH EVERYDAY AT BEDTIME    vit B-comp  "w-Fe,Ca,FA<1mg (IRON-VITAMINS ORAL) Oral    vitamin D (VITAMIN D3) 1,000 Units, Oral, Daily       Review of patient's allergies indicates:  No Known Allergies     Patient Care Team:  Chema Weldon DO as PCP - General (Family Medicine)  Kelby Butcher MD (Ophthalmology)  Han Brown MD as Consulting Physician (Gastroenterology)     Subjective:     Review of Systems    12 point review of systems conducted, negative except as stated in the history of present illness. See HPI for details.    Objective:     Visit Vitals  BP (!) 133/54 (BP Location: Left arm, Patient Position: Sitting, BP Method: Medium (Automatic))   Pulse 76   Temp 97.5 °F (36.4 °C)   Resp 20   Ht 5' 2" (1.575 m)   Wt 66.8 kg (147 lb 3.2 oz)   SpO2 96%   BMI 26.92 kg/m²       Physical Exam  Vitals and nursing note reviewed.   Constitutional:       General: She is not in acute distress.     Appearance: Normal appearance. She is not toxic-appearing.   HENT:      Head: Normocephalic.      Nose:      Right Turbinates: Enlarged.      Left Turbinates: Enlarged.      Right Sinus: Maxillary sinus tenderness present.      Left Sinus: Maxillary sinus tenderness present.      Mouth/Throat:      Mouth: Mucous membranes are moist.      Pharynx: Oropharynx is clear. No pharyngeal swelling.      Tonsils: No tonsillar exudate.      Comments: Hoarseness   Eyes:      Extraocular Movements: Extraocular movements intact.      Pupils: Pupils are equal, round, and reactive to light.   Cardiovascular:      Rate and Rhythm: Normal rate and regular rhythm.      Pulses: Normal pulses.   Pulmonary:      Effort: Pulmonary effort is normal. No respiratory distress.      Breath sounds: Normal breath sounds. No stridor or decreased air movement. No wheezing, rhonchi or rales.   Abdominal:      General: Abdomen is flat. Bowel sounds are normal. There is no distension.      Palpations: Abdomen is soft. There is no mass.      Tenderness: There is no abdominal " "tenderness.   Musculoskeletal:      Cervical back: Neck supple.   Lymphadenopathy:      Cervical: No cervical adenopathy.   Skin:     General: Skin is warm and dry.   Neurological:      General: No focal deficit present.      Mental Status: She is alert and oriented to person, place, and time.         Labs Reviewed:     Chemistry:  Lab Results   Component Value Date     06/15/2023    K 4.4 06/15/2023    CHLORIDE 108 (H) 06/15/2023    BUN 16.0 06/15/2023    CREATININE 0.82 06/15/2023    EGFRNORACEVR >60 06/15/2023    GLUCOSE 108 06/15/2023    CALCIUM 9.7 06/15/2023    ALKPHOS 70 06/15/2023    LABPROT 6.9 06/15/2023    ALBUMIN 3.9 06/15/2023    BILIDIR 0.2 04/28/2021    IBILI 0.20 04/28/2021    AST 27 06/15/2023    ALT 28 06/15/2023    [id number] 81.9 (H) 06/08/2022    TSH 1.185 04/05/2019        No results found for: "HGBA1C", "MICROALBCREA"     Hematology:  Lab Results   Component Value Date    WBC 6.11 06/15/2023    HGB 13.6 06/15/2023    HCT 40.2 06/15/2023     06/15/2023       Lipid Panel:  Lab Results   Component Value Date    CHOL 164 06/15/2023    HDL 48 06/15/2023    LDL 88.00 06/15/2023    TRIG 138 06/15/2023    TOTALCHOLEST 3 06/15/2023        Urine:  Lab Results   Component Value Date    COLORUA Yellow 06/08/2022    APPEARANCEUA Clear 06/08/2022    SGUA 1.020 06/08/2022    PHUA 6.5 06/08/2022    PROTEINUA Negative 06/08/2022    GLUCOSEUA Negative 06/08/2022    KETONESUA Negative 06/08/2022    BLOODUA 1+ (A) 06/08/2022    NITRITESUA Negative 06/08/2022    LEUKOCYTESUR 2+ (A) 06/08/2022    RBCUA 3-5 06/08/2022    WBCUA 0-2 06/08/2022    BACTERIA Moderate (A) 06/08/2022        Assessment:       ICD-10-CM ICD-9-CM   1. Primary hypertension  I10 401.9   2. Acute non-recurrent maxillary sinusitis  J01.00 461.0        Plan:     1. Primary hypertension  Assessment & Plan:  BP has improved.   Continue Lisinopril 40 mg daily.   Low Sodium Diet (DASH Diet - Less than 2 grams of sodium per " day).  Monitor blood pressure daily and log. Report consistent numbers greater than 140/90.  Maintain healthy weight with goal BMI <30. Exercise 30 minutes per day, 5 days per week.        2. Acute non-recurrent maxillary sinusitis  Assessment & Plan:  Start Augmentin 875mg BID x 7 days + Medrol Dose Pack + Flonase.   Avoid Irritants and allergens.  Wash hands frequently to prevent common colds.  Drink plenty of fluids to thin mucous and/or use humidifier.    Consider NeilMed Saline Sinus Rinse BID.  Apply warm compress for sinus pain.        Orders:  -     amoxicillin-clavulanate 875-125mg (AUGMENTIN) 875-125 mg per tablet; Take 1 tablet by mouth every 12 (twelve) hours. for 7 days  Dispense: 14 tablet; Refill: 0  -     fluticasone propionate (FLONASE) 50 mcg/actuation nasal spray; 1 spray (50 mcg total) by Each Nostril route Daily.  Dispense: 9.9 mL; Refill: 2  -     methylPREDNISolone (MEDROL DOSEPACK) 4 mg tablet; use as directed  Dispense: 1 each; Refill: 0         Follow up in about 5 months (around 6/3/2024), or if symptoms worsen or fail to improve, for Wellness. In addition to their scheduled follow up, the patient has also been instructed to follow up on as needed basis.     Trang Kay NP

## 2024-01-03 NOTE — ASSESSMENT & PLAN NOTE
BP has improved.   Continue Lisinopril 40 mg daily.   Low Sodium Diet (DASH Diet - Less than 2 grams of sodium per day).  Monitor blood pressure daily and log. Report consistent numbers greater than 140/90.  Maintain healthy weight with goal BMI <30. Exercise 30 minutes per day, 5 days per week.

## 2024-01-28 DIAGNOSIS — F41.9 ANXIETY: ICD-10-CM

## 2024-01-29 RX ORDER — FLUOXETINE 10 MG/1
CAPSULE ORAL
Qty: 90 CAPSULE | Refills: 1 | Status: SHIPPED | OUTPATIENT
Start: 2024-01-29

## 2024-03-29 DIAGNOSIS — J01.00 ACUTE NON-RECURRENT MAXILLARY SINUSITIS: ICD-10-CM

## 2024-04-01 RX ORDER — FLUTICASONE PROPIONATE 50 MCG
1 SPRAY, SUSPENSION (ML) NASAL
Qty: 48 ML | Refills: 1 | Status: SHIPPED | OUTPATIENT
Start: 2024-04-01

## 2024-04-08 PROBLEM — J01.00 ACUTE NON-RECURRENT MAXILLARY SINUSITIS: Status: RESOLVED | Noted: 2024-01-03 | Resolved: 2024-04-08

## 2024-04-11 ENCOUNTER — HOSPITAL ENCOUNTER (EMERGENCY)
Facility: HOSPITAL | Age: 79
Discharge: HOME | End: 2024-04-11
Attending: GENERAL PRACTICE
Payer: MEDICARE

## 2024-04-11 VITALS
SYSTOLIC BLOOD PRESSURE: 196 MMHG | OXYGEN SATURATION: 97 % | TEMPERATURE: 98 F | WEIGHT: 160 LBS | HEIGHT: 65 IN | BODY MASS INDEX: 26.66 KG/M2 | DIASTOLIC BLOOD PRESSURE: 89 MMHG | HEART RATE: 63 BPM | RESPIRATION RATE: 20 BRPM
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
## 2024-04-11 DIAGNOSIS — S50.11XA CONTUSION OF RIGHT FOREARM, INITIAL ENCOUNTER: Primary | ICD-10-CM

## 2024-04-11 PROCEDURE — 99283 EMERGENCY DEPT VISIT LOW MDM: CPT | Mod: 25

## 2024-04-11 PROCEDURE — 25000003 PHARM REV CODE 250: Performed by: GENERAL PRACTICE

## 2024-04-11 RX ORDER — TRAMADOL HYDROCHLORIDE 50 MG/1
50 TABLET ORAL EVERY 6 HOURS PRN
Qty: 20 TABLET | Refills: 0 | Status: SHIPPED | OUTPATIENT
Start: 2024-04-11

## 2024-04-11 RX ORDER — KETOROLAC TROMETHAMINE 10 MG/1
10 TABLET, FILM COATED ORAL
Status: COMPLETED | OUTPATIENT
Start: 2024-04-11 | End: 2024-04-11

## 2024-04-11 RX ADMIN — KETOROLAC TROMETHAMINE 10 MG: 10 TABLET, FILM COATED ORAL at 11:04

## 2024-04-11 NOTE — ED NOTES
Pt ambulated to room 1 w/steady gait. Pt c/o right forearm/wrist pain/swelling after falling yesterday at home. Pt states she was getting up from sitting on the floor and fell on an iron chair. Pt denies feeling dizzy prior standing, hitting head, and no LOC. Pt did take Tylenol for pain last night which did help w/the pain. Pt states she just wants to make sure its not broken. Pt has full ROM on right upper extremity and denies numbness/tingling. Radial pulses 2+ bilaterally.

## 2024-04-11 NOTE — ED PROVIDER NOTES
Encounter Date: 4/11/2024       History     Chief Complaint   Patient presents with    Fall     Fall, injury to right FA.  Reports was sitting on floor, tried to get up, and was tangled and fell on iron chair, yesterday at 2pm. ROM, CMS WNL.       Fall, injury to right FA.  Reports was sitting on floor, tried to get up, and was tangled and fell on iron chair, yesterday at 2pm. ROM, CMS WNL.      The history is provided by the patient.     Review of patient's allergies indicates:  No Known Allergies  Past Medical History:   Diagnosis Date    Amnesia     Anxiety disorder, unspecified     GERD (gastroesophageal reflux disease)     HTN (hypertension)     Insomnia     Major depression, recurrent     Mass of right breast     Mixed hyperlipidemia     Personal history of colonic polyps 05/11/2023    s/p polypectomy - Dr Han Brown    Vitamin D deficiency      Past Surgical History:   Procedure Laterality Date    APPENDECTOMY  1959    ASPIRATION OF SYNOVIAL CYST Left 09/01/2016    hand    BACK SURGERY  1996    COLONOSCOPY  10/18/2017    COLONOSCOPY  1997    COLONOSCOPY N/A 05/11/2023    Dr Han Brown    EYE SURGERY Bilateral     CATARACT    LIGATION, HEMORRHOIDS N/A 05/11/2023    Dr Han Brown    MASS EXCISION Left 09/01/2016    thigh     Family History   Problem Relation Age of Onset    Coronary artery disease Mother         CABG    Pulmonary embolism Mother         cause of death    Hypertension Mother     Hyperlipidemia Mother     Diabetes Mellitus Father     Hypertension Father     Hyperlipidemia Father     Heart attacks under age 50 Son 45        cause of death     Social History     Tobacco Use    Smoking status: Never    Smokeless tobacco: Never   Substance Use Topics    Alcohol use: Never    Drug use: Never     Review of Systems   Constitutional: Negative.    HENT: Negative.     Eyes: Negative.    Respiratory: Negative.     Cardiovascular: Negative.    Gastrointestinal: Negative.    Endocrine:  Negative.    Genitourinary: Negative.    Musculoskeletal:  Positive for joint swelling and myalgias.   Skin: Negative.    Allergic/Immunologic: Negative.    Neurological: Negative.    Hematological: Negative.    Psychiatric/Behavioral: Negative.     All other systems reviewed and are negative.      Physical Exam     Initial Vitals [04/11/24 1004]   BP Pulse Resp Temp SpO2   (!) 196/81 74 20 98.4 °F (36.9 °C) 98 %      MAP       --         Physical Exam    Nursing note and vitals reviewed.  Constitutional: She appears well-developed and well-nourished.   HENT:   Head: Normocephalic and atraumatic.   Eyes: EOM are normal. Pupils are equal, round, and reactive to light.   Neck: Neck supple.   Normal range of motion.  Cardiovascular:  Normal rate, regular rhythm, normal heart sounds and intact distal pulses.           Pulmonary/Chest: Breath sounds normal.   Abdominal: Abdomen is soft. Bowel sounds are normal.   Musculoskeletal:         General: Tenderness present. Normal range of motion.        Arms:       Cervical back: Normal range of motion and neck supple.      Comments: Marked bruising of the right lateral forearm.  No deformity. FROM     Neurological: She is alert and oriented to person, place, and time. She has normal strength. GCS score is 15. GCS eye subscore is 4. GCS verbal subscore is 5. GCS motor subscore is 6.   Skin: Skin is warm and dry.   Psychiatric: She has a normal mood and affect. Her behavior is normal. Judgment and thought content normal.         ED Course   Procedures  Labs Reviewed - No data to display       Imaging Results              X-Ray Forearm Right (Preliminary result)  Result time 04/11/24 10:37:58      Wet Read by Sancho Arnett MD (04/11/24 10:37:58, [facility] - Emergency Dept, Emergency Medicine)    No acute fractures or dislocations are appreciated                                     Medications   ketorolac tablet 10 mg (has no administration in time range)     Medical  Decision Making  Marked bruising but no fracture noted on x-ray.  We will place an Ace wrap and give the patient Toradol here.  I will give the patient some Ultram as a prescription as this will likely be increasingly painful.  I have advised the patient to also use ice to keep the swelling down.  It should be noted that she has not on any blood thinners at this time.    Amount and/or Complexity of Data Reviewed  Radiology: ordered and independent interpretation performed.                                      Clinical Impression:  Final diagnoses:  [W19.XXXA] Fall  [S50.11XA] Contusion of right forearm, initial encounter (Primary)          ED Disposition Condition    Discharge Stable          ED Prescriptions       Medication Sig Dispense Start Date End Date Auth. Provider    traMADoL (ULTRAM) 50 mg tablet Take 1 tablet (50 mg total) by mouth every 6 (six) hours as needed for Pain. 20 tablet 4/11/2024 -- Sancho Arnett MD          Follow-up Information       Follow up With Specialties Details Why Contact Info    Chema Weldon, DO Family Medicine Schedule an appointment as soon as possible for a visit in 3 days For wound re-check [address] [place] [id number]  [phone]               Sancho Arnett MD  04/11/24 3427

## 2024-05-29 DIAGNOSIS — E78.2 MIXED HYPERLIPIDEMIA: ICD-10-CM

## 2024-05-29 DIAGNOSIS — I10 PRIMARY HYPERTENSION: ICD-10-CM

## 2024-05-29 DIAGNOSIS — Z11.59 NEED FOR HEPATITIS C SCREENING TEST: ICD-10-CM

## 2024-05-29 DIAGNOSIS — Z00.00 WELLNESS EXAMINATION: Primary | ICD-10-CM

## 2024-05-29 DIAGNOSIS — E55.9 VITAMIN D DEFICIENCY: ICD-10-CM

## 2024-05-31 ENCOUNTER — LAB VISIT (OUTPATIENT)
Dept: LAB | Facility: HOSPITAL | Age: 79
End: 2024-05-31
Payer: MEDICARE

## 2024-05-31 DIAGNOSIS — Z11.59 NEED FOR HEPATITIS C SCREENING TEST: ICD-10-CM

## 2024-05-31 DIAGNOSIS — Z00.00 WELLNESS EXAMINATION: ICD-10-CM

## 2024-05-31 DIAGNOSIS — I10 PRIMARY HYPERTENSION: ICD-10-CM

## 2024-05-31 DIAGNOSIS — E78.2 MIXED HYPERLIPIDEMIA: ICD-10-CM

## 2024-05-31 DIAGNOSIS — E55.9 VITAMIN D DEFICIENCY: ICD-10-CM

## 2024-05-31 LAB
25(OH)D3+25(OH)D2 SERPL-MCNC: 69 NG/ML (ref 30–80)
ALBUMIN SERPL-MCNC: 3.9 G/DL (ref 3.4–4.8)
ALBUMIN/GLOB SERPL: 1.3 RATIO (ref 1.1–2)
ALP SERPL-CCNC: 68 UNIT/L (ref 40–150)
ALT SERPL-CCNC: 26 UNIT/L (ref 0–55)
ANION GAP SERPL CALC-SCNC: 10 MEQ/L
AST SERPL-CCNC: 23 UNIT/L (ref 5–34)
BASOPHILS # BLD AUTO: 0.03 X10(3)/MCL
BASOPHILS NFR BLD AUTO: 0.5 %
BILIRUB SERPL-MCNC: 0.5 MG/DL
BUN SERPL-MCNC: 11 MG/DL (ref 9.8–20.1)
CALCIUM SERPL-MCNC: 9.6 MG/DL (ref 8.4–10.2)
CHLORIDE SERPL-SCNC: 106 MMOL/L (ref 98–107)
CHOLEST SERPL-MCNC: 171 MG/DL
CHOLEST/HDLC SERPL: 3 {RATIO} (ref 0–5)
CO2 SERPL-SCNC: 28 MMOL/L (ref 23–31)
CREAT SERPL-MCNC: 0.77 MG/DL (ref 0.55–1.02)
CREAT/UREA NIT SERPL: 14
EOSINOPHIL # BLD AUTO: 0.17 X10(3)/MCL (ref 0–0.9)
EOSINOPHIL NFR BLD AUTO: 2.6 %
ERYTHROCYTE [DISTWIDTH] IN BLOOD BY AUTOMATED COUNT: 12.1 % (ref 11.5–17)
GFR SERPLBLD CREATININE-BSD FMLA CKD-EPI: >60 ML/MIN/1.73/M2
GLOBULIN SER-MCNC: 3.1 GM/DL (ref 2.4–3.5)
GLUCOSE SERPL-MCNC: 98 MG/DL (ref 82–115)
HCT VFR BLD AUTO: 43.3 % (ref 37–47)
HCV AB SERPL QL IA: NONREACTIVE
HDLC SERPL-MCNC: 52 MG/DL (ref 35–60)
HGB BLD-MCNC: 14.4 G/DL (ref 12–16)
IMM GRANULOCYTES # BLD AUTO: 0.01 X10(3)/MCL (ref 0–0.04)
IMM GRANULOCYTES NFR BLD AUTO: 0.2 %
LDLC SERPL CALC-MCNC: 90 MG/DL (ref 50–140)
LYMPHOCYTES # BLD AUTO: 1.93 X10(3)/MCL (ref 0.6–4.6)
LYMPHOCYTES NFR BLD AUTO: 29 %
MCH RBC QN AUTO: 30.3 PG (ref 27–31)
MCHC RBC AUTO-ENTMCNC: 33.3 G/DL (ref 33–36)
MCV RBC AUTO: 91.2 FL (ref 80–94)
MONOCYTES # BLD AUTO: 0.58 X10(3)/MCL (ref 0.1–1.3)
MONOCYTES NFR BLD AUTO: 8.7 %
NEUTROPHILS # BLD AUTO: 3.93 X10(3)/MCL (ref 2.1–9.2)
NEUTROPHILS NFR BLD AUTO: 59 %
NRBC BLD AUTO-RTO: 0 %
PLATELET # BLD AUTO: 246 X10(3)/MCL (ref 130–400)
PMV BLD AUTO: 9.6 FL (ref 7.4–10.4)
POTASSIUM SERPL-SCNC: 3.9 MMOL/L (ref 3.5–5.1)
PROT SERPL-MCNC: 7 GM/DL (ref 5.8–7.6)
RBC # BLD AUTO: 4.75 X10(6)/MCL (ref 4.2–5.4)
SODIUM SERPL-SCNC: 144 MMOL/L (ref 136–145)
TRIGL SERPL-MCNC: 144 MG/DL (ref 37–140)
VLDLC SERPL CALC-MCNC: 29 MG/DL
WBC # SPEC AUTO: 6.65 X10(3)/MCL (ref 4.5–11.5)

## 2024-05-31 PROCEDURE — 36415 COLL VENOUS BLD VENIPUNCTURE: CPT

## 2024-05-31 PROCEDURE — 85025 COMPLETE CBC W/AUTO DIFF WBC: CPT

## 2024-05-31 PROCEDURE — 86803 HEPATITIS C AB TEST: CPT

## 2024-05-31 PROCEDURE — 82306 VITAMIN D 25 HYDROXY: CPT

## 2024-05-31 PROCEDURE — 80053 COMPREHEN METABOLIC PANEL: CPT

## 2024-05-31 PROCEDURE — 80061 LIPID PANEL: CPT

## 2024-06-03 ENCOUNTER — TELEPHONE (OUTPATIENT)
Dept: FAMILY MEDICINE | Facility: CLINIC | Age: 79
End: 2024-06-03
Payer: MEDICARE

## 2024-06-03 NOTE — TELEPHONE ENCOUNTER
----- Message from Trang Kay NP sent at 6/3/2024  8:29 AM CDT -----  All labs within normal ranges.

## 2024-06-04 ENCOUNTER — OFFICE VISIT (OUTPATIENT)
Dept: FAMILY MEDICINE | Facility: CLINIC | Age: 79
End: 2024-06-04
Payer: MEDICARE

## 2024-06-04 VITALS
RESPIRATION RATE: 20 BRPM | HEART RATE: 82 BPM | DIASTOLIC BLOOD PRESSURE: 59 MMHG | SYSTOLIC BLOOD PRESSURE: 144 MMHG | OXYGEN SATURATION: 95 % | WEIGHT: 147 LBS | TEMPERATURE: 98 F | HEIGHT: 65 IN | BODY MASS INDEX: 24.49 KG/M2

## 2024-06-04 DIAGNOSIS — I65.23 BILATERAL CAROTID ARTERY STENOSIS: ICD-10-CM

## 2024-06-04 DIAGNOSIS — Z00.00 MEDICARE ANNUAL WELLNESS VISIT, SUBSEQUENT: Primary | ICD-10-CM

## 2024-06-04 DIAGNOSIS — I10 PRIMARY HYPERTENSION: ICD-10-CM

## 2024-06-04 DIAGNOSIS — E78.2 MIXED HYPERLIPIDEMIA: ICD-10-CM

## 2024-06-04 DIAGNOSIS — Z78.0 POST-MENOPAUSAL: ICD-10-CM

## 2024-06-04 DIAGNOSIS — Z12.31 ENCOUNTER FOR SCREENING MAMMOGRAM FOR MALIGNANT NEOPLASM OF BREAST: ICD-10-CM

## 2024-06-04 PROCEDURE — G0439 PPPS, SUBSEQ VISIT: HCPCS | Mod: ,,,

## 2024-06-04 RX ORDER — LOSARTAN POTASSIUM 100 MG/1
50 TABLET ORAL DAILY
Qty: 15 TABLET | Refills: 0 | Status: SHIPPED | OUTPATIENT
Start: 2024-06-04 | End: 2024-07-04

## 2024-06-04 NOTE — PROGRESS NOTES
Patient ID: [id number]     Chief Complaint: Medicare Annual Wellness     HPI:     Lupis Peguero is a 78 y.o. female here today for a Medicare Annual Wellness visit and comprehensive Health Risk Assessment.     Health Maintenance   Topic Date Due    TETANUS VACCINE  Never done    Shingles Vaccine (1 of 2) Never done    Mammogram  12/14/2022    DEXA Scan  06/04/2024    Lipid Panel  05/31/2029    Hepatitis C Screening  Completed        Past Medical History:   Diagnosis Date    Amnesia     Anxiety disorder, unspecified     GERD (gastroesophageal reflux disease)     HTN (hypertension)     Insomnia     Major depression, recurrent     Mass of right breast     Mixed hyperlipidemia     Personal history of colonic polyps 05/11/2023    s/p polypectomy - Dr Han Brown    Vitamin D deficiency         Past Surgical History:   Procedure Laterality Date    APPENDECTOMY  1959    ASPIRATION OF SYNOVIAL CYST Left 09/01/2016    hand    BACK SURGERY  1996    COLONOSCOPY  10/18/2017    COLONOSCOPY  1997    COLONOSCOPY N/A 05/11/2023    Dr Han Brown    EYE SURGERY Bilateral     CATARACT    LIGATION, HEMORRHOIDS N/A 05/11/2023    Dr Han Brown    MASS EXCISION Left 09/01/2016    thigh        Social History     Socioeconomic History    Marital status:    Tobacco Use    Smoking status: Never    Smokeless tobacco: Never   Substance and Sexual Activity    Alcohol use: Never    Drug use: Never    Sexual activity: Not Currently     Social Determinants of Health     Financial Resource Strain: Low Risk  (6/14/2023)    Overall Financial Resource Strain (CARDIA)     Difficulty of Paying Living Expenses: Not hard at all   Food Insecurity: No Food Insecurity (6/14/2023)    Hunger Vital Sign     Worried About Running Out of Food in the Last Year: Never true     Ran Out of Food in the Last Year: Never true   Transportation Needs: No Transportation Needs (6/14/2023)    PRAPARE - Transportation     Lack of Transportation  (Medical): No     Lack of Transportation (Non-Medical): No   Physical Activity: Sufficiently Active (6/14/2023)    Exercise Vital Sign     Days of Exercise per Week: 6 days     Minutes of Exercise per Session: 30 min   Stress: Stress Concern Present (6/14/2023)    [facility] - Occupational Stress Questionnaire     Feeling of Stress : To some extent   Housing Stability: Low Risk  (6/14/2023)    Housing Stability Vital Sign     Unable to Pay for Housing in the Last Year: No     Number of Places Lived in the Last Year: 1     Unstable Housing in the Last Year: No        Family History   Problem Relation Name Age of Onset    Coronary artery disease Mother          CABG    Pulmonary embolism Mother          cause of death    Hypertension Mother      Hyperlipidemia Mother      Diabetes Mellitus Father      Hypertension Father      Hyperlipidemia Father      Heart attacks under age 50 Son  45        cause of death        Current Outpatient Medications   Medication Instructions    aspirin (ECOTRIN) 81 mg, Oral, Daily    atorvastatin (LIPITOR) 20 mg, Oral    coenzyme Q10 100 mg, Oral, Daily    FLUoxetine 10 MG capsule TAKE 1 CAPSULE BY MOUTH EVERY DAY    fluticasone propionate (FLONASE) 50 mcg, Each Nostril    losartan (COZAAR) 50 mg, Oral, Daily    melatonin 10 mg Tab Oral    montelukast (SINGULAIR) 10 mg, Oral    omeprazole (PRILOSEC) 40 mg, Oral, Daily    traMADoL (ULTRAM) 50 mg, Oral, Every 6 hours PRN    traZODone (DESYREL) 100 MG tablet TAKE 1 TABLET BY MOUTH EVERYDAY AT BEDTIME    vit B-comp w-Fe,Ca,FA<1mg (IRON-VITAMINS ORAL) Oral    vitamin D (VITAMIN D3) 1,000 Units, Oral, Daily       Review of patient's allergies indicates:  No Known Allergies     Immunization History   Administered Date(s) Administered    COVID-19, MRNA, LN-S, PF (Pfizer) (Purple Cap) 08/03/2021, 08/25/2021    Pneumococcal Conjugate - 13 Valent 06/03/2021    Pneumococcal Polysaccharide - 23 Valent 02/27/2017     "    Patient Care Team:  Chema Weldon DO as PCP - General (Family Medicine)  Kelby Butcher MD (Ophthalmology)  Han Brown MD as Consulting Physician (Gastroenterology)    Subjective:     Review of Systems    12 point review of systems conducted, negative except as stated in the history of present illness. See HPI for details.    Objective:     Visit Vitals  BP (!) 144/59 (BP Location: Left arm, Patient Position: Sitting, BP Method: Large (Automatic))   Pulse 82   Temp 97.8 °F (36.6 °C)   Resp 20   Ht 5' 5" (1.651 m)   Wt 66.7 kg (147 lb)   SpO2 95%   BMI 24.46 kg/m²       Physical Exam  Vitals and nursing note reviewed.   Constitutional:       General: She is not in acute distress.     Appearance: She is not ill-appearing.   HENT:      Head: Normocephalic and atraumatic.      Mouth/Throat:      Mouth: Mucous membranes are moist.      Pharynx: Oropharynx is clear.   Eyes:      General: No scleral icterus.     Extraocular Movements: Extraocular movements intact.      Conjunctiva/sclera: Conjunctivae normal.      Pupils: Pupils are equal, round, and reactive to light.   Neck:      Vascular: No carotid bruit.   Cardiovascular:      Rate and Rhythm: Normal rate and regular rhythm.      Pulses:           Carotid pulses are 3+ on the right side and 2+ on the left side.     Heart sounds: Murmur heard.      No friction rub. No gallop.      Comments: Positive Brenham's Sign  Pulmonary:      Effort: Pulmonary effort is normal. No respiratory distress.      Breath sounds: Normal breath sounds. No wheezing, rhonchi or rales.   Abdominal:      General: Abdomen is flat. Bowel sounds are normal. There is no distension.      Palpations: Abdomen is soft. There is no mass.      Tenderness: There is no abdominal tenderness.   Musculoskeletal:         General: Normal range of motion.      Cervical back: Normal range of motion and neck supple.   Skin:     General: Skin is warm and dry.   Neurological:      General: " No focal deficit present.      Mental Status: She is alert.   Psychiatric:         Mood and Affect: Mood normal.       Labs Reviewed:     Chemistry:  Lab Results   Component Value Date     05/31/2024    K 3.9 05/31/2024    BUN 11.0 05/31/2024    CREATININE 0.77 05/31/2024    EGFRNORACEVR >60 05/31/2024    GLUCOSE 98 05/31/2024    CALCIUM 9.6 05/31/2024    ALKPHOS 68 05/31/2024    LABPROT 7.0 05/31/2024    ALBUMIN 3.9 05/31/2024    BILIDIR 0.2 04/28/2021    IBILI 0.20 04/28/2021    AST 23 05/31/2024    ALT 26 05/31/2024    [id number] 69 05/31/2024    TSH 1.185 04/05/2019          Hematology:  Lab Results   Component Value Date    WBC 6.65 05/31/2024    HGB 14.4 05/31/2024    HCT 43.3 05/31/2024     05/31/2024       Lipid Panel:  Lab Results   Component Value Date    CHOL 171 05/31/2024    HDL 52 05/31/2024    LDL 90.00 05/31/2024    TRIG 144 (H) 05/31/2024    TOTALCHOLEST 3 05/31/2024        Urine:  Lab Results   Component Value Date    APPEARANCEUA Clear 06/08/2022    SGUA 1.020 06/08/2022    PROTEINUA Negative 06/08/2022    KETONESUA Negative 06/08/2022    LEUKOCYTESUR 2+ (A) 06/08/2022    RBCUA 3-5 06/08/2022    WBCUA 0-2 06/08/2022    BACTERIA Moderate (A) 06/08/2022        Assessment:       ICD-10-CM ICD-9-CM   1. Medicare annual wellness visit, subsequent  Z00.00 V70.0   2. Primary hypertension  I10 401.9   3. Mixed hyperlipidemia  E78.2 272.2   4. Bilateral carotid artery stenosis  I65.23 433.10     433.30   5. Post-menopausal  Z78.0 V49.81   6. Encounter for screening mammogram for malignant neoplasm of breast  Z12.31 V76.12        Plan:     1. Medicare annual wellness visit, subsequent    2. Primary hypertension  Assessment & Plan:  Stop Lisinopril 40 mg daily.   Start Losartan 50 mg daily.   RTC in three weeks.   Low Sodium Diet (DASH Diet - Less than 2 grams of sodium per day).  Monitor blood pressure daily and log. Report consistent numbers greater than 140/90.  Maintain healthy weight with  goal BMI <30. Exercise 30 minutes per day, 5 days per week.    Orders:  -     losartan (COZAAR) 100 MG tablet; Take 0.5 tablets (50 mg total) by mouth once daily.  Dispense: 15 tablet; Refill: 0    3. Mixed hyperlipidemia  Assessment & Plan:  Lab Results   Component Value Date    LDL 90.00 05/31/2024    TRIG 144 (H) 05/31/2024    HDL 52 05/31/2024    TOTALCHOLEST 3 05/31/2024     Continue Atorvastatin 20 mg daily.   Follow a low cholesterol, low saturated fat diet with less that 200mg of cholesterol a day.  Avoid fried foods and high saturated fats (high saturated fats less than 7% of calories).  Add Flax Seed/Fish Oil supplements to diet. Increase dietary fiber.  Regular exercise can reduce LDL and raise HDL. Stressed importance of physical activity 5 times per week for 30 minutes per day.       4. Bilateral carotid artery stenosis  -     US Carotid Bilateral; Future; Expected date: 06/04/2024    5. Post-menopausal  -     CT Bone Density Study 1 + Sites Axial; Future; Expected date: 06/04/2024    6. Encounter for screening mammogram for malignant neoplasm of breast  -     Mammo Digital Screening Bilat w/ Yony; Future; Expected date: 06/04/2024      The following assessments were completed and reviewed. See completed screening forms and assessments within the Encounter Summary.  [x] Health Risk Assessment   [x] CVD Risk Factors - Reviewed  [x] Obesity/Physical Activity -  Encouraged daily 30 minute physical activity x 5 days per week.   [x] Home Safety/Living Situation  [x] CAGE  [x] Depression (PHQ) Screen  [x] Timed Get Up and Go  [x] Whisper Test  [x] Cognitive Function/Impairment Screen  [x] Nutrition Screening  [x] ADL Screen  [x] Opioid Screen:  [x] Patient does not have a prescription for opioids.   [] Patient has a prescription for opioids but is at low risk for abuse.   [x] Substance Abuse Screen:   [x] Patient does not use substances.   [x] Advanced Care Planning:  Advance Care Planning   Date:  06/04/2024  Patient did not wish or was not able to name a surrogate decision maker or provide an Advance Care Plan.      Provided patient with a 5-10 year written screening schedule and personal prevention plan. Recommendations were developed using the USPSTF age appropriate recommendations. Education, counseling, and referrals were provided as needed. After Visit Summary printed and given to patient, which includes a list of additional screenings\tests needed.    Follow up in about 3 weeks (around 6/25/2024) for HTN Follow Up. In addition to their scheduled follow up, the patient has also been instructed to follow up on as needed basis.     Trang Kay NP

## 2024-06-04 NOTE — ASSESSMENT & PLAN NOTE
Lab Results   Component Value Date    LDL 90.00 05/31/2024    TRIG 144 (H) 05/31/2024    HDL 52 05/31/2024    TOTALCHOLEST 3 05/31/2024     Continue Atorvastatin 20 mg daily.   Follow a low cholesterol, low saturated fat diet with less that 200mg of cholesterol a day.  Avoid fried foods and high saturated fats (high saturated fats less than 7% of calories).  Add Flax Seed/Fish Oil supplements to diet. Increase dietary fiber.  Regular exercise can reduce LDL and raise HDL. Stressed importance of physical activity 5 times per week for 30 minutes per day.

## 2024-06-04 NOTE — ASSESSMENT & PLAN NOTE
Stop Lisinopril 40 mg daily.   Start Losartan 50 mg daily.   RTC in three weeks.   Low Sodium Diet (DASH Diet - Less than 2 grams of sodium per day).  Monitor blood pressure daily and log. Report consistent numbers greater than 140/90.  Maintain healthy weight with goal BMI <30. Exercise 30 minutes per day, 5 days per week.

## 2024-06-12 ENCOUNTER — HOSPITAL ENCOUNTER (OUTPATIENT)
Dept: RADIOLOGY | Facility: HOSPITAL | Age: 79
Discharge: HOME | End: 2024-06-12
Payer: MEDICARE

## 2024-06-12 DIAGNOSIS — Z78.0 POST-MENOPAUSAL: ICD-10-CM

## 2024-06-12 DIAGNOSIS — I65.23 BILATERAL CAROTID ARTERY STENOSIS: ICD-10-CM

## 2024-06-12 DIAGNOSIS — Z12.31 ENCOUNTER FOR SCREENING MAMMOGRAM FOR MALIGNANT NEOPLASM OF BREAST: ICD-10-CM

## 2024-06-12 PROCEDURE — 77063 BREAST TOMOSYNTHESIS BI: CPT | Mod: 26,,, | Performed by: STUDENT IN AN ORGANIZED HEALTH CARE EDUCATION/TRAINING PROGRAM

## 2024-06-12 PROCEDURE — 93880 EXTRACRANIAL BILAT STUDY: CPT | Mod: TC

## 2024-06-12 PROCEDURE — 77067 SCR MAMMO BI INCL CAD: CPT | Mod: 26,,, | Performed by: STUDENT IN AN ORGANIZED HEALTH CARE EDUCATION/TRAINING PROGRAM

## 2024-06-12 PROCEDURE — 77063 BREAST TOMOSYNTHESIS BI: CPT | Mod: TC

## 2024-06-12 PROCEDURE — 77078 CT BONE DENSITY AXIAL: CPT | Mod: TC

## 2024-06-13 ENCOUNTER — TELEPHONE (OUTPATIENT)
Dept: FAMILY MEDICINE | Facility: CLINIC | Age: 79
End: 2024-06-13
Payer: MEDICARE

## 2024-06-13 DIAGNOSIS — M81.0 OSTEOPOROSIS, UNSPECIFIED OSTEOPOROSIS TYPE, UNSPECIFIED PATHOLOGICAL FRACTURE PRESENCE: Primary | ICD-10-CM

## 2024-06-13 DIAGNOSIS — M81.0 OSTEOPOROSIS, UNSPECIFIED OSTEOPOROSIS TYPE, UNSPECIFIED PATHOLOGICAL FRACTURE PRESENCE: ICD-10-CM

## 2024-06-13 NOTE — TELEPHONE ENCOUNTER
----- Message from Trang Kay NP sent at 6/12/2024 11:39 AM CDT -----  Please inform patient of lab results.     1. Osteoporosis in the lumbar spine. I recommend Prolia every 6 months. Please let me know if the patient would like to proceed.     Thanks for all you do,   Trang

## 2024-06-14 ENCOUNTER — TELEPHONE (OUTPATIENT)
Dept: FAMILY MEDICINE | Facility: CLINIC | Age: 79
End: 2024-06-14
Payer: MEDICARE

## 2024-06-14 NOTE — TELEPHONE ENCOUNTER
----- Message from Trang Kay NP sent at 6/14/2024  9:48 AM CDT -----  Please inform patient of lab results.     1. Right carotid artery 50-69% stenosis and Left carotid artery less than 50%. I sent the patient to see cardiology during the winter. Did she follow up?     Thanks for all you do,   Trang

## 2024-06-17 NOTE — PROGRESS NOTES
Please inform patient of abnormal mammogram which warrants further evaluation with diagnostic imaging and/or biopsy. Please ensure that the patient has been scheduled by the breast center to prevent a delay in care.

## 2024-06-18 ENCOUNTER — TELEPHONE (OUTPATIENT)
Dept: RADIOLOGY | Facility: HOSPITAL | Age: 79
End: 2024-06-18
Payer: MEDICARE

## 2024-06-25 ENCOUNTER — OFFICE VISIT (OUTPATIENT)
Dept: FAMILY MEDICINE | Facility: CLINIC | Age: 79
End: 2024-06-25
Payer: MEDICARE

## 2024-06-25 VITALS
DIASTOLIC BLOOD PRESSURE: 80 MMHG | OXYGEN SATURATION: 95 % | SYSTOLIC BLOOD PRESSURE: 150 MMHG | HEIGHT: 65 IN | TEMPERATURE: 98 F | HEART RATE: 75 BPM | WEIGHT: 147 LBS | BODY MASS INDEX: 24.49 KG/M2 | RESPIRATION RATE: 16 BRPM

## 2024-06-25 DIAGNOSIS — I10 PRIMARY HYPERTENSION: ICD-10-CM

## 2024-06-25 PROCEDURE — 99214 OFFICE O/P EST MOD 30 MIN: CPT | Mod: ,,,

## 2024-06-25 RX ORDER — LOSARTAN POTASSIUM 100 MG/1
100 TABLET ORAL DAILY
Qty: 30 TABLET | Refills: 1 | Status: SHIPPED | OUTPATIENT
Start: 2024-06-25 | End: 2024-08-24

## 2024-06-25 NOTE — ASSESSMENT & PLAN NOTE
Losartan increased to 100 mg daily.   Low Sodium Diet (DASH Diet - Less than 2 grams of sodium per day).  Monitor blood pressure daily and log. Report consistent numbers greater than 140/90.  Maintain healthy weight with goal BMI <30. Exercise 30 minutes per day, 5 days per week.    RTC in 6 weeks with BP readings.

## 2024-06-25 NOTE — PROGRESS NOTES
Patient ID: [id number]     Chief Complaint: 3 wk f/u HTN      HPI:     Lupis Peguero, a 78-year-old woman, is present today for a follow-up on her hypertension (HTN). Her blood pressures have remained high. The patient mentioned that she visited the cardiology department yesterday for bilateral carotid artery stenosis. She has not made any changes to her diet and does not monitor her salt consumption. Additionally, she admits to not drinking enough water. There are no other concerns reported today.    Past Medical History:   Diagnosis Date    Amnesia     Anxiety disorder, unspecified     GERD (gastroesophageal reflux disease)     HTN (hypertension)     Insomnia     Major depression, recurrent     Mass of right breast     Mixed hyperlipidemia     Personal history of colonic polyps 05/11/2023    s/p polypectomy - Dr Han Brown    Vitamin D deficiency         Past Surgical History:   Procedure Laterality Date    APPENDECTOMY  1959    ASPIRATION OF SYNOVIAL CYST Left 09/01/2016    hand    BACK SURGERY  1996    COLONOSCOPY  10/18/2017    COLONOSCOPY  1997    COLONOSCOPY N/A 05/11/2023    Dr Han Brown    EYE SURGERY Bilateral     CATARACT    LIGATION, HEMORRHOIDS N/A 05/11/2023    Dr Han Brown    MASS EXCISION Left 09/01/2016    thigh        Social History     Socioeconomic History    Marital status:    Tobacco Use    Smoking status: Never    Smokeless tobacco: Never   Substance and Sexual Activity    Alcohol use: Never    Drug use: Never    Sexual activity: Not Currently     Social Determinants of Health     Financial Resource Strain: Low Risk  (6/14/2023)    Overall Financial Resource Strain (CARDIA)     Difficulty of Paying Living Expenses: Not hard at all   Food Insecurity: No Food Insecurity (6/14/2023)    Hunger Vital Sign     Worried About Running Out of Food in the Last Year: Never true     Ran Out of Food in the Last Year: Never true   Transportation Needs: No Transportation  Needs (6/14/2023)    PRAPARE - Transportation     Lack of Transportation (Medical): No     Lack of Transportation (Non-Medical): No   Physical Activity: Sufficiently Active (6/14/2023)    Exercise Vital Sign     Days of Exercise per Week: 6 days     Minutes of Exercise per Session: 30 min   Stress: Stress Concern Present (6/14/2023)    [facility] - Occupational Stress Questionnaire     Feeling of Stress : To some extent   Housing Stability: Low Risk  (6/14/2023)    Housing Stability Vital Sign     Unable to Pay for Housing in the Last Year: No     Number of Places Lived in the Last Year: 1     Unstable Housing in the Last Year: No        Current Outpatient Medications   Medication Instructions    aspirin (ECOTRIN) 81 mg, Oral, Daily    atorvastatin (LIPITOR) 20 mg, Oral    coenzyme Q10 100 mg, Oral, Daily    denosumab (PROLIA) 60 mg, Subcutaneous, Every 6 months    FLUoxetine 10 MG capsule TAKE 1 CAPSULE BY MOUTH EVERY DAY    fluticasone propionate (FLONASE) 50 mcg, Each Nostril    losartan (COZAAR) 100 mg, Oral, Daily    melatonin 10 mg Tab Oral    montelukast (SINGULAIR) 10 mg, Oral    omeprazole (PRILOSEC) 40 mg, Oral, Daily    traMADoL (ULTRAM) 50 mg, Oral, Every 6 hours PRN    traZODone (DESYREL) 100 MG tablet TAKE 1 TABLET BY MOUTH EVERYDAY AT BEDTIME    vit B-comp w-Fe,Ca,FA<1mg (IRON-VITAMINS ORAL) Oral    vitamin D (VITAMIN D3) 1,000 Units, Oral, Daily       Review of patient's allergies indicates:  No Known Allergies     Patient Care Team:  Chema Weldon DO as PCP - General (Family Medicine)  Kelby Butcher MD (Ophthalmology)  Han Brown MD as Consulting Physician (Gastroenterology)  Des Felipe FNP as Nurse Practitioner (Cardiology)     Subjective:     Review of Systems    12 point review of systems conducted, negative except as stated in the history of present illness. See HPI for details.    Objective:     Visit Vitals  BP (!) 150/80 (BP  "Location: Right arm, Patient Position: Sitting, BP Method: Large (Manual))   Pulse 75   Temp 97.8 °F (36.6 °C)   Resp 16   Ht 5' 5" (1.651 m)   Wt 66.7 kg (147 lb)   SpO2 95%   BMI 24.46 kg/m²     Physical Exam  Vitals and nursing note reviewed.   Constitutional:       General: She is not in acute distress.     Appearance: She is not ill-appearing.   HENT:      Head: Normocephalic and atraumatic.      Mouth/Throat:      Mouth: Mucous membranes are moist.      Pharynx: Oropharynx is clear.   Eyes:      General: No scleral icterus.     Extraocular Movements: Extraocular movements intact.      Conjunctiva/sclera: Conjunctivae normal.      Pupils: Pupils are equal, round, and reactive to light.   Neck:      Vascular: No carotid bruit.   Cardiovascular:      Rate and Rhythm: Normal rate and regular rhythm.      Heart sounds: No murmur heard.     No friction rub. No gallop.   Pulmonary:      Effort: Pulmonary effort is normal. No respiratory distress.      Breath sounds: Normal breath sounds. No wheezing, rhonchi or rales.   Abdominal:      General: Abdomen is flat. Bowel sounds are normal. There is no distension.      Palpations: Abdomen is soft. There is no mass.      Tenderness: There is no abdominal tenderness.   Musculoskeletal:         General: Normal range of motion.      Cervical back: Normal range of motion and neck supple.   Skin:     General: Skin is warm and dry.   Neurological:      General: No focal deficit present.      Mental Status: She is alert.   Psychiatric:         Mood and Affect: Mood normal.       Labs Reviewed:     Chemistry:  Lab Results   Component Value Date     05/31/2024    K 3.9 05/31/2024    BUN 11.0 05/31/2024    CREATININE 0.77 05/31/2024    EGFRNORACEVR >60 05/31/2024    GLUCOSE 98 05/31/2024    CALCIUM 9.6 05/31/2024    ALKPHOS 68 05/31/2024    LABPROT 7.0 05/31/2024    ALBUMIN 3.9 05/31/2024    BILIDIR 0.2 04/28/2021    IBILI 0.20 04/28/2021    AST 23 05/31/2024    ALT 26 " 05/31/2024    [id number] 69 05/31/2024    TSH 1.185 04/05/2019      Hematology:  Lab Results   Component Value Date    WBC 6.65 05/31/2024    HGB 14.4 05/31/2024    HCT 43.3 05/31/2024     05/31/2024     Lipid Panel:  Lab Results   Component Value Date    CHOL 171 05/31/2024    HDL 52 05/31/2024    LDL 90.00 05/31/2024    TRIG 144 (H) 05/31/2024    TOTALCHOLEST 3 05/31/2024      Urine:  Lab Results   Component Value Date    APPEARANCEUA Clear 06/08/2022    SGUA 1.020 06/08/2022    PROTEINUA Negative 06/08/2022    KETONESUA Negative 06/08/2022    LEUKOCYTESUR 2+ (A) 06/08/2022    RBCUA 3-5 06/08/2022    WBCUA 0-2 06/08/2022    BACTERIA Moderate (A) 06/08/2022        Assessment:       ICD-10-CM ICD-9-CM   1. Primary hypertension  I10 401.9      Plan:     1. Primary hypertension  Assessment & Plan:  Losartan increased to 100 mg daily.   Low Sodium Diet (DASH Diet - Less than 2 grams of sodium per day).  Monitor blood pressure daily and log. Report consistent numbers greater than 140/90.  Maintain healthy weight with goal BMI <30. Exercise 30 minutes per day, 5 days per week.    RTC in 6 weeks with BP readings.     Orders:  -     losartan (COZAAR) 100 MG tablet; Take 1 tablet (100 mg total) by mouth once daily.  Dispense: 30 tablet; Refill: 1      Follow up in about 6 weeks (around 8/6/2024) for HTN F/U . In addition to their scheduled follow up, the patient has also been instructed to follow up on as needed basis.     Future Appointments   Date Time Provider Department Center   7/15/2024  1:00 PM [facility] US1 [facility] US Hallie Br   7/15/2024  1:45 PM [facility] [id number] DX2 [facility] MILENA Hallie Br   8/6/2024  9:40 AM Trang Kay NP [facility]   6/5/2025  9:00 AM Trang Kay NP [facility] Ka'Ryn Rahul, NP

## 2024-07-22 DIAGNOSIS — E78.2 MIXED HYPERLIPIDEMIA: ICD-10-CM

## 2024-07-22 RX ORDER — ATORVASTATIN CALCIUM 20 MG/1
20 TABLET, FILM COATED ORAL
Qty: 90 TABLET | Refills: 3 | Status: SHIPPED | OUTPATIENT
Start: 2024-07-22

## 2024-07-26 DIAGNOSIS — F41.9 ANXIETY: ICD-10-CM

## 2024-07-26 RX ORDER — FLUOXETINE 10 MG/1
CAPSULE ORAL
Qty: 90 CAPSULE | Refills: 1 | Status: SHIPPED | OUTPATIENT
Start: 2024-07-26

## 2024-08-06 ENCOUNTER — OFFICE VISIT (OUTPATIENT)
Dept: FAMILY MEDICINE | Facility: CLINIC | Age: 79
End: 2024-08-06
Payer: MEDICARE

## 2024-08-06 VITALS
OXYGEN SATURATION: 96 % | DIASTOLIC BLOOD PRESSURE: 64 MMHG | RESPIRATION RATE: 20 BRPM | BODY MASS INDEX: 26.61 KG/M2 | WEIGHT: 144.63 LBS | HEIGHT: 62 IN | HEART RATE: 78 BPM | TEMPERATURE: 98 F | SYSTOLIC BLOOD PRESSURE: 100 MMHG

## 2024-08-06 DIAGNOSIS — I10 PRIMARY HYPERTENSION: Primary | ICD-10-CM

## 2024-08-06 PROCEDURE — 99213 OFFICE O/P EST LOW 20 MIN: CPT | Mod: ,,,

## 2024-08-06 RX ORDER — AMLODIPINE BESYLATE 5 MG/1
5 TABLET ORAL DAILY
COMMUNITY
Start: 2024-07-31

## 2024-08-08 DIAGNOSIS — I10 PRIMARY HYPERTENSION: ICD-10-CM

## 2024-08-08 RX ORDER — LOSARTAN POTASSIUM 100 MG/1
100 TABLET ORAL DAILY
Qty: 30 TABLET | Refills: 1 | Status: SHIPPED | OUTPATIENT
Start: 2024-08-08 | End: 2024-10-07

## 2024-08-11 DIAGNOSIS — F51.01 PRIMARY INSOMNIA: ICD-10-CM

## 2024-08-12 RX ORDER — TRAZODONE HYDROCHLORIDE 100 MG/1
100 TABLET ORAL NIGHTLY
Qty: 90 TABLET | Refills: 5 | Status: SHIPPED | OUTPATIENT
Start: 2024-08-12

## 2024-08-28 ENCOUNTER — HOSPITAL ENCOUNTER (INPATIENT)
Facility: HOSPITAL | Age: 79
LOS: 2 days | Discharge: HOME | DRG: 345 | End: 2024-08-30
Attending: FAMILY MEDICINE | Admitting: FAMILY MEDICINE
Payer: MEDICARE

## 2024-08-28 ENCOUNTER — OFFICE VISIT (OUTPATIENT)
Dept: FAMILY MEDICINE | Facility: CLINIC | Age: 79
End: 2024-08-28
Payer: MEDICARE

## 2024-08-28 VITALS
OXYGEN SATURATION: 98 % | WEIGHT: 147 LBS | HEIGHT: 62 IN | DIASTOLIC BLOOD PRESSURE: 68 MMHG | HEART RATE: 70 BPM | BODY MASS INDEX: 27.05 KG/M2 | TEMPERATURE: 98 F | SYSTOLIC BLOOD PRESSURE: 138 MMHG | RESPIRATION RATE: 18 BRPM

## 2024-08-28 DIAGNOSIS — I10 PRIMARY HYPERTENSION: Primary | ICD-10-CM

## 2024-08-28 DIAGNOSIS — R10.84 GENERALIZED ABDOMINAL PAIN: ICD-10-CM

## 2024-08-28 DIAGNOSIS — K92.1 MELENA: ICD-10-CM

## 2024-08-28 DIAGNOSIS — K92.1 BLOOD IN STOOL: Primary | ICD-10-CM

## 2024-08-28 DIAGNOSIS — F33.2 SEVERE EPISODE OF RECURRENT MAJOR DEPRESSIVE DISORDER, WITHOUT PSYCHOTIC FEATURES: ICD-10-CM

## 2024-08-28 PROBLEM — R10.9 ABDOMINAL PAIN: Status: ACTIVE | Noted: 2024-08-28

## 2024-08-28 LAB
ALBUMIN SERPL-MCNC: 3.6 G/DL (ref 3.4–4.8)
ALBUMIN/GLOB SERPL: 1.2 RATIO (ref 1.1–2)
ALP SERPL-CCNC: 68 UNIT/L (ref 40–150)
ALT SERPL-CCNC: 26 UNIT/L (ref 0–55)
ANION GAP SERPL CALC-SCNC: 8 MEQ/L
AST SERPL-CCNC: 21 UNIT/L (ref 5–34)
BACTERIA #/AREA URNS AUTO: NORMAL /HPF
BASOPHILS # BLD AUTO: 0.04 X10(3)/MCL
BASOPHILS NFR BLD AUTO: 0.6 %
BILIRUB SERPL-MCNC: 0.3 MG/DL
BILIRUB UR QL STRIP.AUTO: NEGATIVE
BUN SERPL-MCNC: 17 MG/DL (ref 9.8–20.1)
CALCIUM SERPL-MCNC: 9.1 MG/DL (ref 8.4–10.2)
CHLORIDE SERPL-SCNC: 106 MMOL/L (ref 98–107)
CLARITY UR: CLEAR
CO2 SERPL-SCNC: 28 MMOL/L (ref 23–31)
COLOR UR AUTO: YELLOW
CREAT SERPL-MCNC: 0.78 MG/DL (ref 0.55–1.02)
CREAT/UREA NIT SERPL: 22
EOSINOPHIL # BLD AUTO: 0.16 X10(3)/MCL (ref 0–0.9)
EOSINOPHIL NFR BLD AUTO: 2.5 %
ERYTHROCYTE [DISTWIDTH] IN BLOOD BY AUTOMATED COUNT: 11.9 % (ref 11.5–17)
GFR SERPLBLD CREATININE-BSD FMLA CKD-EPI: >60 ML/MIN/1.73/M2
GLOBULIN SER-MCNC: 2.9 GM/DL (ref 2.4–3.5)
GLUCOSE SERPL-MCNC: 121 MG/DL (ref 82–115)
GLUCOSE UR QL STRIP: NEGATIVE
HCT VFR BLD AUTO: 35.5 % (ref 37–47)
HGB BLD-MCNC: 11.6 G/DL (ref 12–16)
HGB UR QL STRIP: ABNORMAL
IMM GRANULOCYTES # BLD AUTO: 0.01 X10(3)/MCL (ref 0–0.04)
IMM GRANULOCYTES NFR BLD AUTO: 0.2 %
KETONES UR QL STRIP: NEGATIVE
LACTATE SERPL-SCNC: 0.9 MMOL/L (ref 0.5–2.2)
LEUKOCYTE ESTERASE UR QL STRIP: NEGATIVE
LYMPHOCYTES # BLD AUTO: 2.03 X10(3)/MCL (ref 0.6–4.6)
LYMPHOCYTES NFR BLD AUTO: 31.5 %
MCH RBC QN AUTO: 30.2 PG (ref 27–31)
MCHC RBC AUTO-ENTMCNC: 32.7 G/DL (ref 33–36)
MCV RBC AUTO: 92.4 FL (ref 80–94)
MONOCYTES # BLD AUTO: 0.65 X10(3)/MCL (ref 0.1–1.3)
MONOCYTES NFR BLD AUTO: 10.1 %
NEUTROPHILS # BLD AUTO: 3.56 X10(3)/MCL (ref 2.1–9.2)
NEUTROPHILS NFR BLD AUTO: 55.1 %
NITRITE UR QL STRIP: NEGATIVE
NRBC BLD AUTO-RTO: 0 %
PH UR STRIP: 6 [PH]
PLATELET # BLD AUTO: 230 X10(3)/MCL (ref 130–400)
PMV BLD AUTO: 9.1 FL (ref 7.4–10.4)
POTASSIUM SERPL-SCNC: 3.9 MMOL/L (ref 3.5–5.1)
PROT SERPL-MCNC: 6.5 GM/DL (ref 5.8–7.6)
PROT UR QL STRIP: NEGATIVE
RBC # BLD AUTO: 3.84 X10(6)/MCL (ref 4.2–5.4)
RBC #/AREA URNS AUTO: NORMAL /HPF
SODIUM SERPL-SCNC: 142 MMOL/L (ref 136–145)
SP GR UR STRIP.AUTO: 1.01 (ref 1–1.03)
SQUAMOUS #/AREA URNS AUTO: NORMAL /HPF
UROBILINOGEN UR STRIP-ACNC: 0.2
WBC # BLD AUTO: 6.45 X10(3)/MCL (ref 4.5–11.5)
WBC #/AREA URNS AUTO: NORMAL /HPF

## 2024-08-28 PROCEDURE — 85025 COMPLETE CBC W/AUTO DIFF WBC: CPT | Performed by: FAMILY MEDICINE

## 2024-08-28 PROCEDURE — 0D9E8ZZ DRAINAGE OF LARGE INTESTINE, VIA NATURAL OR ARTIFICIAL OPENING ENDOSCOPIC: ICD-10-PCS | Performed by: SURGERY

## 2024-08-28 PROCEDURE — 36415 COLL VENOUS BLD VENIPUNCTURE: CPT | Performed by: FAMILY MEDICINE

## 2024-08-28 PROCEDURE — 25000003 PHARM REV CODE 250: Performed by: FAMILY MEDICINE

## 2024-08-28 PROCEDURE — 83605 ASSAY OF LACTIC ACID: CPT | Performed by: FAMILY MEDICINE

## 2024-08-28 PROCEDURE — 11000001 HC ACUTE MED/SURG PRIVATE ROOM

## 2024-08-28 PROCEDURE — 25500020 PHARM REV CODE 255: Performed by: FAMILY MEDICINE

## 2024-08-28 PROCEDURE — 80053 COMPREHEN METABOLIC PANEL: CPT | Performed by: FAMILY MEDICINE

## 2024-08-28 PROCEDURE — 81003 URINALYSIS AUTO W/O SCOPE: CPT | Performed by: FAMILY MEDICINE

## 2024-08-28 RX ORDER — LOSARTAN POTASSIUM 25 MG/1
100 TABLET ORAL DAILY
Status: DISCONTINUED | OUTPATIENT
Start: 2024-08-28 | End: 2024-08-30 | Stop reason: HOSPADM

## 2024-08-28 RX ORDER — IOPAMIDOL 755 MG/ML
100 INJECTION, SOLUTION INTRAVASCULAR
Status: COMPLETED | OUTPATIENT
Start: 2024-08-28 | End: 2024-08-28

## 2024-08-28 RX ORDER — PANTOPRAZOLE SODIUM 40 MG/1
40 TABLET, DELAYED RELEASE ORAL NIGHTLY
Status: DISCONTINUED | OUTPATIENT
Start: 2024-08-28 | End: 2024-08-30 | Stop reason: HOSPADM

## 2024-08-28 RX ORDER — PANTOPRAZOLE SODIUM 40 MG/1
40 TABLET, DELAYED RELEASE ORAL DAILY
Status: DISCONTINUED | OUTPATIENT
Start: 2024-08-28 | End: 2024-08-28

## 2024-08-28 RX ORDER — FLUOXETINE 10 MG/1
10 CAPSULE ORAL DAILY
Status: DISCONTINUED | OUTPATIENT
Start: 2024-08-29 | End: 2024-08-30 | Stop reason: HOSPADM

## 2024-08-28 RX ORDER — MONTELUKAST SODIUM 10 MG/1
10 TABLET ORAL NIGHTLY
Status: DISCONTINUED | OUTPATIENT
Start: 2024-08-28 | End: 2024-08-30 | Stop reason: HOSPADM

## 2024-08-28 RX ORDER — AMLODIPINE BESYLATE 5 MG/1
5 TABLET ORAL DAILY
Status: DISCONTINUED | OUTPATIENT
Start: 2024-08-28 | End: 2024-08-30 | Stop reason: HOSPADM

## 2024-08-28 RX ORDER — PANTOPRAZOLE SODIUM 40 MG/1
40 TABLET, DELAYED RELEASE ORAL NIGHTLY
Status: DISCONTINUED | OUTPATIENT
Start: 2024-08-29 | End: 2024-08-28

## 2024-08-28 RX ORDER — TRAZODONE HYDROCHLORIDE 50 MG/1
50 TABLET ORAL NIGHTLY
Status: DISCONTINUED | OUTPATIENT
Start: 2024-08-28 | End: 2024-08-30 | Stop reason: HOSPADM

## 2024-08-28 RX ADMIN — TRAZODONE HYDROCHLORIDE 50 MG: 50 TABLET ORAL at 08:08

## 2024-08-28 RX ADMIN — MONTELUKAST 10 MG: 10 TABLET, FILM COATED ORAL at 08:08

## 2024-08-28 RX ADMIN — IOPAMIDOL 100 ML: 755 INJECTION, SOLUTION INTRAVENOUS at 03:08

## 2024-08-28 RX ADMIN — PANTOPRAZOLE SODIUM 40 MG: 40 TABLET, DELAYED RELEASE ORAL at 08:08

## 2024-08-28 NOTE — NURSING
Nurses Note -- 4 Eyes      8/28/2024   3:28 PM      Skin assessed during: Admit      [x] No Altered Skin Integrity Present    []Prevention Measures Documented      [] Yes- Altered Skin Integrity Present or Discovered   [] LDA Added if Not in Epic (Describe Wound)   [] New Altered Skin Integrity was Present on Admit and Documented in LDA   [] Wound Image Taken    Wound Care Consulted? No    Attending Nurse:  Regina Dennison RN/Staff Member:   Hang

## 2024-08-28 NOTE — PLAN OF CARE
08/28/24 1348   Discharge Assessment   Assessment Type Discharge Planning Assessment   Confirmed/corrected address, phone number and insurance Yes   Confirmed Demographics Correct on Facesheet   Source of Information patient   When was your last doctors appointment? 08/28/24   Communicated LING with patient/caregiver Date not available/Unable to determine   Reason For Admission Abdominal Pain   People in Home friend(s)   Facility Arrived From: Home   Do you expect to return to your current living situation? Yes   Do you have help at home or someone to help you manage your care at home? No   Prior to hospitilization cognitive status: Alert/Oriented   Current cognitive status: Alert/Oriented   Walking or Climbing Stairs Difficulty no   Dressing/Bathing Difficulty no   Equipment Currently Used at Home none   Readmission within 30 days? No   Patient currently being followed by outpatient case management? No   Do you currently have service(s) that help you manage your care at home? No   Do you take prescription medications? Yes   Do you have prescription coverage? Yes   Coverage Medicare   Do you have any problems affording any of your prescribed medications? No   Is the patient taking medications as prescribed? yes   Who is going to help you get home at discharge? Toya Bower (Daughter)  [phone]   How do you get to doctors appointments? car, drives self   Are you on dialysis? No   Do you take coumadin? No   Discharge Plan A Home   DME Needed Upon Discharge  none   Discharge Plan discussed with: Patient   Transition of Care Barriers None   Physical Activity   On average, how many days per week do you engage in moderate to strenuous exercise (like a brisk walk)? 6 days   On average, how many minutes do you engage in exercise at this level? 30 min   Financial Resource Strain   How hard is it for you to pay for the very basics like food, housing, medical care, and heating? Not hard   Housing Stability   In the last  12 months, was there a time when you were not able to pay the mortgage or rent on time? N   At any time in the past 12 months, were you homeless or living in a shelter (including now)? N   Transportation Needs   Has the lack of transportation kept you from medical appointments, meetings, work or from getting things needed for daily living? No   Food Insecurity   Within the past 12 months, you worried that your food would run out before you got the money to buy more. Never true   Within the past 12 months, the food you bought just didn't last and you didn't have money to get more. Never true   Stress   Do you feel stress - tense, restless, nervous, or anxious, or unable to sleep at night because your mind is troubled all the time - these days? To some exte   Social Isolation   How often do you feel lonely or isolated from those around you?  Never   Alcohol Use   Q1: How often do you have a drink containing alcohol? Monthly or l   Q2: How many drinks containing alcohol do you have on a typical day when you are drinking? 1 or 2   Q3: How often do you have six or more drinks on one occasion? Less than mo   Utilities   In the past 12 months has the electric, gas, oil, or water company threatened to shut off services in your home? No   Health Literacy   How often do you need to have someone help you when you read instructions, pamphlets, or other written material from your doctor or pharmacy? Never     Currently does not use Home health or DME's. Does not think she will need on discharge. Will defer to Dr. Weldon for further discharge plan.

## 2024-08-28 NOTE — ASSESSMENT & PLAN NOTE
CBC shows mild anemia. Will continue to trend.   -CT negative for inflammation  Surgery consulted for endoscopy on 8/30/24.

## 2024-08-28 NOTE — SUBJECTIVE & OBJECTIVE
Past Medical History:   Diagnosis Date    Amnesia     Anxiety disorder, unspecified     GERD (gastroesophageal reflux disease)     HTN (hypertension)     Insomnia     Major depression, recurrent     Mass of right breast     Mixed hyperlipidemia     Personal history of colonic polyps 05/11/2023    s/p polypectomy - Dr Han Brown    Vitamin D deficiency        Past Surgical History:   Procedure Laterality Date    APPENDECTOMY  1959    ASPIRATION OF SYNOVIAL CYST Left 09/01/2016    hand    BACK SURGERY  1996    COLONOSCOPY  10/18/2017    COLONOSCOPY  1997    COLONOSCOPY N/A 05/11/2023    Dr Han Brown    EYE SURGERY Bilateral     CATARACT    LIGATION, HEMORRHOIDS N/A 05/11/2023    Dr Han Brown    MASS EXCISION Left 09/01/2016    thigh       Review of patient's allergies indicates:  No Known Allergies    Current Facility-Administered Medications on File Prior to Encounter   Medication    0.9%  NaCl infusion     Current Outpatient Medications on File Prior to Encounter   Medication Sig    amLODIPine (NORVASC) 5 MG tablet Take 5 mg by mouth once daily.    atorvastatin (LIPITOR) 20 MG tablet TAKE 1 TABLET BY MOUTH EVERY DAY    coenzyme Q10 100 mg capsule Take 100 mg by mouth once daily.    FLUoxetine 10 MG capsule TAKE 1 CAPSULE BY MOUTH EVERY DAY    losartan (COZAAR) 100 MG tablet Take 1 tablet (100 mg total) by mouth once daily.    melatonin 10 mg Tab Take by mouth.    montelukast (SINGULAIR) 10 mg tablet TAKE 1 TABLET BY MOUTH EVERY DAY    omeprazole (PRILOSEC) 40 MG capsule Take 1 capsule (40 mg total) by mouth once daily.    traZODone (DESYREL) 100 MG tablet Take 1 tablet (100 mg total) by mouth every evening.    vitamin D (VITAMIN D3) 1000 units Tab Take 1,000 Units by mouth once daily.    aspirin (ECOTRIN) 81 MG EC tablet Take 81 mg by mouth once daily.    fluticasone propionate (FLONASE) 50 mcg/actuation nasal spray USE 1 SPRAY IN EACH NOSTRIL ONCE DAILY    vit B-comp w-Fe,Ca,FA<1mg  (IRON-VITAMINS ORAL) Take by mouth.     Family History       Problem Relation (Age of Onset)    Coronary artery disease Mother    Diabetes Mellitus Father    Heart attacks under age 50 Son (45)    Hyperlipidemia Mother, Father    Hypertension Mother, Father    Pulmonary embolism Mother          Tobacco Use    Smoking status: Never    Smokeless tobacco: Never   Substance and Sexual Activity    Alcohol use: Never    Drug use: Never    Sexual activity: Not Currently     Review of Systems   Constitutional:  Negative for chills and fever.   HENT:  Negative for congestion and sore throat.    Respiratory:  Negative for shortness of breath and wheezing.    Cardiovascular:  Negative for chest pain and palpitations.   Gastrointestinal:  Positive for abdominal pain, anal bleeding, blood in stool and diarrhea. Negative for constipation, nausea, rectal pain and vomiting.   Musculoskeletal:  Negative for back pain and myalgias.   Skin:  Negative for rash and wound.   Neurological:  Positive for dizziness.     Objective:     Vital Signs (Most Recent):  Temp: 97.9 °F (36.6 °C) (08/28/24 1350)  Pulse: 74 (08/28/24 1350)  Resp: 18 (08/28/24 1350)  BP: (!) 146/72 (08/28/24 1350)  SpO2: 96 % (08/28/24 1350) Vital Signs (24h Range):  Temp:  [97.9 °F (36.6 °C)-98.1 °F (36.7 °C)] 97.9 °F (36.6 °C)  Pulse:  [70-74] 74  Resp:  [18] 18  SpO2:  [96 %-98 %] 96 %  BP: (138-146)/(68-72) 146/72     Weight: 66.7 kg (147 lb 0.8 oz)  Body mass index is 26.9 kg/m².     Physical Exam  Vitals reviewed.   Constitutional:       General: She is not in acute distress.     Appearance: Normal appearance.   Eyes:      Extraocular Movements: EOM normal.      Pupils: Pupils are equal, round, and reactive to light.   Cardiovascular:      Rate and Rhythm: Normal rate and regular rhythm.      Heart sounds: No murmur heard.     No friction rub. No gallop.   Pulmonary:      Effort: No respiratory distress.      Breath sounds: No wheezing, rhonchi or rales.    Abdominal:      General: Abdomen is flat. Bowel sounds are normal.      Palpations: Abdomen is soft.   Musculoskeletal:         General: No swelling, tenderness or deformity.      Right lower leg: No edema.      Left lower leg: No edema.   Skin:     General: Skin is warm and dry.      Findings: No lesion or rash.   Neurological:      General: No focal deficit present.      Mental Status: She is alert.   Psychiatric:         Mood and Affect: Mood normal.              CRANIAL NERVES     CN I  cranial nerve I not tested    CN II   Visual fields full to confrontation.     CN III, IV, VI   Pupils are equal, round, and reactive to light.  Extraocular motions are normal.     CN V   Facial sensation intact.     CN VII   Facial expression full, symmetric.     CN VIII   CN VIII normal.     CN IX, X   CN IX normal.   CN X normal.     CN XI   CN XI normal.        Significant Labs: All pertinent labs within the past 24 hours have been reviewed.    Significant Imaging: I have reviewed all pertinent imaging results/findings within the past 24 hours.

## 2024-08-28 NOTE — HPI
Patient is a 79y female who presented to outpatient clinic today with complaint of melena, BRBPR, and abdominal pain that started on 8/26/24. Past medical history significant for colonic polyps and HTN. Currently on no anticoagulation other than daily 81mg aspirin. Last dose of oral iron supplemental was greater than 2 weeks ago. Denies fever, nausea, or vomiting.     Directly admitted for further evaluation and surgical consultation for endoscopy to rule out suspected GI bleed. Will begin with CBC, CMP, lactate, UA, and CT abdomen w/without contrast.

## 2024-08-28 NOTE — PROGRESS NOTES
Patient ID: [id number]     Chief Complaint: Diarrhea (Constant black diarrhea since Monday 8/26/24 around 4pm), Hemorrhoids (Swollen rectal), Rectal Bleeding (Bright red blood in diarrhea), and Dizziness    HPI:     Lupis Peguero is a 79 y.o. female here today for Diarrhea (Constant black diarrhea since Monday 8/26/24 around 4pm), Hemorrhoids (Swollen rectal), Rectal Bleeding (Bright red blood in diarrhea), and Dizziness. She reports stopping iron supplement several weeks ago.   -------------------------------------    Amnesia    Anxiety disorder, unspecified    GERD (gastroesophageal reflux disease)    HTN (hypertension)    Insomnia    Major depression, recurrent    Mass of right breast    Mixed hyperlipidemia    Personal history of colonic polyps    s/p polypectomy - Dr Han Brown    Vitamin D deficiency        Past Surgical History:   Procedure Laterality Date    APPENDECTOMY  1959    ASPIRATION OF SYNOVIAL CYST Left 09/01/2016    hand    BACK SURGERY  1996    COLONOSCOPY  10/18/2017    COLONOSCOPY  1997    COLONOSCOPY N/A 05/11/2023    Dr Han Brown    EYE SURGERY Bilateral     CATARACT    LIGATION, HEMORRHOIDS N/A 05/11/2023    Dr Han Brown    MASS EXCISION Left 09/01/2016    thigh       Review of patient's allergies indicates:  No Known Allergies    Outpatient Medications Marked as Taking for the 8/28/24 encounter (Office Visit) with Chema Weldon, DO   Medication Sig Dispense Refill    amLODIPine (NORVASC) 5 MG tablet Take 5 mg by mouth once daily.      aspirin (ECOTRIN) 81 MG EC tablet Take 81 mg by mouth once daily.      atorvastatin (LIPITOR) 20 MG tablet TAKE 1 TABLET BY MOUTH EVERY DAY 90 tablet 3    coenzyme Q10 100 mg capsule Take 100 mg by mouth once daily.      FLUoxetine 10 MG capsule TAKE 1 CAPSULE BY MOUTH EVERY DAY 90 capsule 1    fluticasone propionate (FLONASE) 50 mcg/actuation nasal spray USE 1 SPRAY IN EACH NOSTRIL ONCE DAILY 48 mL 1    losartan (COZAAR) 100  MG tablet Take 1 tablet (100 mg total) by mouth once daily. 30 tablet 1    melatonin 10 mg Tab Take by mouth.      montelukast (SINGULAIR) 10 mg tablet TAKE 1 TABLET BY MOUTH EVERY DAY 90 tablet 3    omeprazole (PRILOSEC) 40 MG capsule Take 1 capsule (40 mg total) by mouth once daily. 30 capsule 11    traZODone (DESYREL) 100 MG tablet Take 1 tablet (100 mg total) by mouth every evening. 90 tablet 5    vit B-comp w-Fe,Ca,FA<1mg (IRON-VITAMINS ORAL) Take by mouth.      vitamin D (VITAMIN D3) 1000 units Tab Take 1,000 Units by mouth once daily.         Social History     Socioeconomic History    Marital status:    Tobacco Use    Smoking status: Never    Smokeless tobacco: Never   Substance and Sexual Activity    Alcohol use: Never    Drug use: Never    Sexual activity: Not Currently     Social Determinants of Health     Financial Resource Strain: Low Risk  (6/14/2023)    Overall Financial Resource Strain (CARDIA)     Difficulty of Paying Living Expenses: Not hard at all   Food Insecurity: No Food Insecurity (6/14/2023)    Hunger Vital Sign     Worried About Running Out of Food in the Last Year: Never true     Ran Out of Food in the Last Year: Never true   Transportation Needs: No Transportation Needs (6/14/2023)    PRAPARE - Transportation     Lack of Transportation (Medical): No     Lack of Transportation (Non-Medical): No   Physical Activity: Sufficiently Active (6/14/2023)    Exercise Vital Sign     Days of Exercise per Week: 6 days     Minutes of Exercise per Session: 30 min   Stress: Stress Concern Present (6/14/2023)    [place] [employer] - Occupational Stress Questionnaire     Feeling of Stress : To some extent   Housing Stability: Low Risk  (6/14/2023)    Housing Stability Vital Sign     Unable to Pay for Housing in the Last Year: No     Number of Places Lived in the Last Year: 1     Unstable Housing in the Last Year: No        Family History   Problem Relation Name Age of Onset     "Coronary artery disease Mother          CABG    Pulmonary embolism Mother          cause of death    Hypertension Mother      Hyperlipidemia Mother      Diabetes Mellitus Father      Hypertension Father      Hyperlipidemia Father      Heart attacks under age 50 Son  45        cause of death        Patient Care Team:  Chema Weldon DO as PCP - General (Family Medicine)  Kelby Butcher MD (Ophthalmology)  Han Brown MD as Consulting Physician (Gastroenterology)  Des Felipe FNP as Nurse Practitioner (Cardiology)     Subjective:     Review of Systems   Constitutional:  Negative for fever.   Respiratory:  Negative for shortness of breath and wheezing.    Cardiovascular:  Negative for chest pain.   Gastrointestinal:  Positive for abdominal pain, blood in stool, diarrhea and melena.   Neurological:  Positive for dizziness. Negative for headaches.   Psychiatric/Behavioral:  Negative for depression.        See HPI for details  All Other ROS: Negative except as stated in HPI.       Objective:     /68 (BP Location: Left arm, Patient Position: Sitting, BP Method: Large (Manual))   Pulse 70   Temp 98.1 °F (36.7 °C)   Resp 18   Ht 5' 2" (1.575 m)   Wt 66.7 kg (147 lb)   SpO2 98%   BMI 26.89 kg/m²     Physical Exam  Vitals reviewed.   Constitutional:       General: She is not in acute distress.     Appearance: Normal appearance.   Cardiovascular:      Rate and Rhythm: Normal rate and regular rhythm.      Heart sounds: No murmur heard.     No friction rub. No gallop.   Pulmonary:      Effort: No respiratory distress.      Breath sounds: No wheezing, rhonchi or rales.   Abdominal:      General: Abdomen is flat. Bowel sounds are normal.      Palpations: Abdomen is soft.   Musculoskeletal:         General: No swelling, tenderness or deformity.      Right lower leg: No edema.      Left lower leg: No edema.   Skin:     General: Skin is warm and dry.      Findings: No lesion or rash. "   Neurological:      General: No focal deficit present.      Mental Status: She is alert.   Psychiatric:         Mood and Affect: Mood normal.         Assessment/Plan:     1. Blood in stool  GI bleed vs hemorrhoids.   2. Melena  -advised patient to stop aspirin. Will direct admit to [facility] and consult general surgery for endoscopy.   3. Severe episode of recurrent major depressive disorder, without psychotic features  -well controlled on fluoxetine 10mg daily.   4. Generalized abdominal pain  -see melena above.         Follow up:     Follow up if symptoms worsen or fail to improve. In addition to their scheduled follow up, the patient has also been instructed to follow up on as needed basis.

## 2024-08-29 PROBLEM — D62 ACUTE BLOOD LOSS ANEMIA: Status: ACTIVE | Noted: 2024-08-29

## 2024-08-29 LAB
ALBUMIN SERPL-MCNC: 3.2 G/DL (ref 3.4–4.8)
ALBUMIN/GLOB SERPL: 1.1 RATIO (ref 1.1–2)
ALP SERPL-CCNC: 67 UNIT/L (ref 40–150)
ALT SERPL-CCNC: 22 UNIT/L (ref 0–55)
ANION GAP SERPL CALC-SCNC: 9 MEQ/L
AST SERPL-CCNC: 20 UNIT/L (ref 5–34)
BASOPHILS # BLD AUTO: 0.03 X10(3)/MCL
BASOPHILS NFR BLD AUTO: 0.5 %
BILIRUB SERPL-MCNC: 0.4 MG/DL
BUN SERPL-MCNC: 11 MG/DL (ref 9.8–20.1)
CALCIUM SERPL-MCNC: 8.8 MG/DL (ref 8.4–10.2)
CHLORIDE SERPL-SCNC: 108 MMOL/L (ref 98–107)
CO2 SERPL-SCNC: 26 MMOL/L (ref 23–31)
CREAT SERPL-MCNC: 0.79 MG/DL (ref 0.55–1.02)
CREAT/UREA NIT SERPL: 14
EOSINOPHIL # BLD AUTO: 0.17 X10(3)/MCL (ref 0–0.9)
EOSINOPHIL NFR BLD AUTO: 2.7 %
ERYTHROCYTE [DISTWIDTH] IN BLOOD BY AUTOMATED COUNT: 11.9 % (ref 11.5–17)
GFR SERPLBLD CREATININE-BSD FMLA CKD-EPI: >60 ML/MIN/1.73/M2
GLOBULIN SER-MCNC: 2.8 GM/DL (ref 2.4–3.5)
GLUCOSE SERPL-MCNC: 98 MG/DL (ref 82–115)
HCT VFR BLD AUTO: 34.9 % (ref 37–47)
HGB BLD-MCNC: 11.7 G/DL (ref 12–16)
IMM GRANULOCYTES # BLD AUTO: 0.02 X10(3)/MCL (ref 0–0.04)
IMM GRANULOCYTES NFR BLD AUTO: 0.3 %
LYMPHOCYTES # BLD AUTO: 1.6 X10(3)/MCL (ref 0.6–4.6)
LYMPHOCYTES NFR BLD AUTO: 25.8 %
MCH RBC QN AUTO: 30.5 PG (ref 27–31)
MCHC RBC AUTO-ENTMCNC: 33.5 G/DL (ref 33–36)
MCV RBC AUTO: 91.1 FL (ref 80–94)
MONOCYTES # BLD AUTO: 0.73 X10(3)/MCL (ref 0.1–1.3)
MONOCYTES NFR BLD AUTO: 11.8 %
NEUTROPHILS # BLD AUTO: 3.64 X10(3)/MCL (ref 2.1–9.2)
NEUTROPHILS NFR BLD AUTO: 58.9 %
NRBC BLD AUTO-RTO: 0 %
PLATELET # BLD AUTO: 210 X10(3)/MCL (ref 130–400)
PMV BLD AUTO: 8.8 FL (ref 7.4–10.4)
POTASSIUM SERPL-SCNC: 3.9 MMOL/L (ref 3.5–5.1)
PROT SERPL-MCNC: 6 GM/DL (ref 5.8–7.6)
RBC # BLD AUTO: 3.83 X10(6)/MCL (ref 4.2–5.4)
SODIUM SERPL-SCNC: 143 MMOL/L (ref 136–145)
WBC # BLD AUTO: 6.19 X10(3)/MCL (ref 4.5–11.5)

## 2024-08-29 PROCEDURE — 36415 COLL VENOUS BLD VENIPUNCTURE: CPT | Performed by: FAMILY MEDICINE

## 2024-08-29 PROCEDURE — 85025 COMPLETE CBC W/AUTO DIFF WBC: CPT | Performed by: FAMILY MEDICINE

## 2024-08-29 PROCEDURE — 11000001 HC ACUTE MED/SURG PRIVATE ROOM

## 2024-08-29 PROCEDURE — 25000003 PHARM REV CODE 250: Performed by: FAMILY MEDICINE

## 2024-08-29 PROCEDURE — 80053 COMPREHEN METABOLIC PANEL: CPT | Performed by: FAMILY MEDICINE

## 2024-08-29 RX ORDER — POLYETHYLENE GLYCOL 3350, SODIUM SULFATE ANHYDROUS, SODIUM BICARBONATE, SODIUM CHLORIDE, POTASSIUM CHLORIDE 236; 22.74; 6.74; 5.86; 2.97 G/4L; G/4L; G/4L; G/4L; G/4L
4000 POWDER, FOR SOLUTION ORAL ONCE
Status: COMPLETED | OUTPATIENT
Start: 2024-08-29 | End: 2024-08-29

## 2024-08-29 RX ADMIN — FLUOXETINE 10 MG: 10 CAPSULE ORAL at 08:08

## 2024-08-29 RX ADMIN — LOSARTAN POTASSIUM 100 MG: 25 TABLET, FILM COATED ORAL at 08:08

## 2024-08-29 RX ADMIN — TRAZODONE HYDROCHLORIDE 50 MG: 50 TABLET ORAL at 09:08

## 2024-08-29 RX ADMIN — AMLODIPINE BESYLATE 5 MG: 5 TABLET ORAL at 08:08

## 2024-08-29 RX ADMIN — PANTOPRAZOLE SODIUM 40 MG: 40 TABLET, DELAYED RELEASE ORAL at 09:08

## 2024-08-29 RX ADMIN — POLYETHYLENE GLYCOL 3350, SODIUM SULFATE ANHYDROUS, SODIUM BICARBONATE, SODIUM CHLORIDE, POTASSIUM CHLORIDE 4000 ML: 236; 22.74; 6.74; 5.86; 2.97 POWDER, FOR SOLUTION ORAL at 04:08

## 2024-08-29 RX ADMIN — MONTELUKAST 10 MG: 10 TABLET, FILM COATED ORAL at 09:08

## 2024-08-29 NOTE — PLAN OF CARE
Problem: Adult Inpatient Plan of Care  Goal: Plan of Care Review  Outcome: Progressing  Goal: Patient-Specific Goal (Individualized)  Outcome: Progressing  Goal: Absence of Hospital-Acquired Illness or Injury  Outcome: Progressing  Goal: Optimal Comfort and Wellbeing  Outcome: Progressing  Goal: Readiness for Transition of Care  Outcome: Progressing     Problem: Pain Acute  Goal: Optimal Pain Control and Function  Outcome: Progressing     Problem: Comorbidity Management  Goal: Blood Pressure in Desired Range  Outcome: Progressing     Problem: Sleep Disturbance  Goal: Adequate Sleep/Rest  Outcome: Progressing     Problem: Diarrhea  Goal: Effective Diarrhea Management  Outcome: Progressing

## 2024-08-29 NOTE — UM SECONDARY REVIEW
79-year-old female with hypertension, presented with melena, bright red blood per rectum, abdominal pain.  She was hemodynamically stable, afebrile on admission.  Initial H/H was 11.6/35.5.  Labs unremarkable.  Intensity of services included clear liquid diet, surgical evaluation.  Continues with frequent H/H checked, electrolyte monitoring, kidney function monitoring.  Continues with a clear liquid diet without any further advancement.  With the limited clinical information, would favor continuation of current level of care and assess with subsequent follow-up to match the severity of illness and intensity of services.       Jose Pugh MD  Utilization Management  Physician Advisor  [facility]  08/29/2024

## 2024-08-30 ENCOUNTER — ANESTHESIA (OUTPATIENT)
Dept: SURGERY | Facility: HOSPITAL | Age: 79
End: 2024-08-30
Payer: MEDICARE

## 2024-08-30 ENCOUNTER — ANESTHESIA EVENT (OUTPATIENT)
Dept: SURGERY | Facility: HOSPITAL | Age: 79
End: 2024-08-30
Payer: MEDICARE

## 2024-08-30 VITALS
SYSTOLIC BLOOD PRESSURE: 148 MMHG | WEIGHT: 147.06 LBS | OXYGEN SATURATION: 97 % | RESPIRATION RATE: 20 BRPM | DIASTOLIC BLOOD PRESSURE: 69 MMHG | HEART RATE: 62 BPM | HEIGHT: 62 IN | BODY MASS INDEX: 27.06 KG/M2 | TEMPERATURE: 98 F

## 2024-08-30 PROBLEM — F41.9 ANXIETY: Status: RESOLVED | Noted: 2022-06-07 | Resolved: 2024-08-30

## 2024-08-30 PROBLEM — D62 ACUTE BLOOD LOSS ANEMIA: Status: RESOLVED | Noted: 2024-08-29 | Resolved: 2024-08-30

## 2024-08-30 PROBLEM — R10.9 ABDOMINAL PAIN: Status: RESOLVED | Noted: 2024-08-28 | Resolved: 2024-08-30

## 2024-08-30 PROBLEM — K92.1 MELENA: Status: RESOLVED | Noted: 2023-05-11 | Resolved: 2024-08-30

## 2024-08-30 LAB
BASOPHILS # BLD AUTO: 0.03 X10(3)/MCL
BASOPHILS NFR BLD AUTO: 0.5 %
EOSINOPHIL # BLD AUTO: 0.09 X10(3)/MCL (ref 0–0.9)
EOSINOPHIL NFR BLD AUTO: 1.4 %
ERYTHROCYTE [DISTWIDTH] IN BLOOD BY AUTOMATED COUNT: 11.8 % (ref 11.5–17)
HCT VFR BLD AUTO: 34.3 % (ref 37–47)
HGB BLD-MCNC: 11.4 G/DL (ref 12–16)
IMM GRANULOCYTES # BLD AUTO: 0.02 X10(3)/MCL (ref 0–0.04)
IMM GRANULOCYTES NFR BLD AUTO: 0.3 %
LYMPHOCYTES # BLD AUTO: 1.75 X10(3)/MCL (ref 0.6–4.6)
LYMPHOCYTES NFR BLD AUTO: 27.6 %
MCH RBC QN AUTO: 30 PG (ref 27–31)
MCHC RBC AUTO-ENTMCNC: 33.2 G/DL (ref 33–36)
MCV RBC AUTO: 90.3 FL (ref 80–94)
MONOCYTES # BLD AUTO: 0.68 X10(3)/MCL (ref 0.1–1.3)
MONOCYTES NFR BLD AUTO: 10.7 %
NEUTROPHILS # BLD AUTO: 3.76 X10(3)/MCL (ref 2.1–9.2)
NEUTROPHILS NFR BLD AUTO: 59.5 %
NRBC BLD AUTO-RTO: 0 %
PLATELET # BLD AUTO: 237 X10(3)/MCL (ref 130–400)
PMV BLD AUTO: 9.3 FL (ref 7.4–10.4)
RBC # BLD AUTO: 3.8 X10(6)/MCL (ref 4.2–5.4)
WBC # BLD AUTO: 6.33 X10(3)/MCL (ref 4.5–11.5)

## 2024-08-30 PROCEDURE — 63600175 PHARM REV CODE 636 W HCPCS: Performed by: NURSE ANESTHETIST, CERTIFIED REGISTERED

## 2024-08-30 PROCEDURE — 25000003 PHARM REV CODE 250: Performed by: NURSE ANESTHETIST, CERTIFIED REGISTERED

## 2024-08-30 PROCEDURE — 87209 SMEAR COMPLEX STAIN: CPT | Performed by: SURGERY

## 2024-08-30 PROCEDURE — 85025 COMPLETE CBC W/AUTO DIFF WBC: CPT | Performed by: FAMILY MEDICINE

## 2024-08-30 PROCEDURE — 36415 COLL VENOUS BLD VENIPUNCTURE: CPT | Performed by: FAMILY MEDICINE

## 2024-08-30 PROCEDURE — 37000008 HC ANESTHESIA 1ST 15 MINUTES: Performed by: SURGERY

## 2024-08-30 PROCEDURE — 37000009 HC ANESTHESIA EA ADD 15 MINS: Performed by: SURGERY

## 2024-08-30 PROCEDURE — 25000003 PHARM REV CODE 250: Performed by: FAMILY MEDICINE

## 2024-08-30 PROCEDURE — 45378 DIAGNOSTIC COLONOSCOPY: CPT | Performed by: SURGERY

## 2024-08-30 RX ORDER — ONDANSETRON HYDROCHLORIDE 2 MG/ML
INJECTION, SOLUTION INTRAVENOUS
Status: DISCONTINUED | OUTPATIENT
Start: 2024-08-30 | End: 2024-08-30

## 2024-08-30 RX ORDER — PROPOFOL 10 MG/ML
VIAL (ML) INTRAVENOUS
Status: DISCONTINUED | OUTPATIENT
Start: 2024-08-30 | End: 2024-08-30

## 2024-08-30 RX ORDER — LIDOCAINE HYDROCHLORIDE 10 MG/ML
INJECTION, SOLUTION EPIDURAL; INFILTRATION; INTRACAUDAL; PERINEURAL
Status: DISCONTINUED | OUTPATIENT
Start: 2024-08-30 | End: 2024-08-30

## 2024-08-30 RX ADMIN — LIDOCAINE HYDROCHLORIDE 20 MG: 10 INJECTION, SOLUTION EPIDURAL; INFILTRATION; INTRACAUDAL; PERINEURAL at 11:08

## 2024-08-30 RX ADMIN — LOSARTAN POTASSIUM 100 MG: 25 TABLET, FILM COATED ORAL at 12:08

## 2024-08-30 RX ADMIN — SODIUM CHLORIDE: 9 INJECTION, SOLUTION INTRAVENOUS at 10:08

## 2024-08-30 RX ADMIN — PROPOFOL 100 MG: 10 INJECTION, EMULSION INTRAVENOUS at 10:08

## 2024-08-30 RX ADMIN — FLUOXETINE 10 MG: 10 CAPSULE ORAL at 12:08

## 2024-08-30 RX ADMIN — AMLODIPINE BESYLATE 5 MG: 5 TABLET ORAL at 12:08

## 2024-08-30 RX ADMIN — ONDANSETRON HYDROCHLORIDE 4 MG: 2 SOLUTION INTRAMUSCULAR; INTRAVENOUS at 11:08

## 2024-08-30 RX ADMIN — PROPOFOL 50 MG: 10 INJECTION, EMULSION INTRAVENOUS at 11:08

## 2024-08-30 NOTE — ANESTHESIA PREPROCEDURE EVALUATION
08/30/2024  Lupis Peguero is a 79 y.o., female.      Pre-op Assessment    I have reviewed the Patient Summary Reports.     I have reviewed the Nursing Notes. I have reviewed the NPO Status.   I have reviewed the Medications.     Review of Systems  Anesthesia Hx:  No problems with previous Anesthesia             Denies Family Hx of Anesthesia complications.    Denies Personal Hx of Anesthesia complications.                    Social:  Non-Smoker       Cardiovascular:  Cardiovascular Normal                                            Pulmonary:  Pulmonary Normal                       Renal/:  Renal/ Normal                 Hepatic/GI:  Hepatic/GI Normal                 Musculoskeletal:  Musculoskeletal Normal                Neurological:  Neurology Normal                                      Endocrine:  Endocrine Normal            Psych:  Psychiatric Normal                    Physical Exam  General: Well nourished, Cooperative, Alert and Oriented  Pt states difficult intubation  Airway:  Mallampati: I / I  Mouth Opening: Normal  TM Distance: Normal  Tongue: Normal  Neck ROM: Normal ROM    Dental:  Intact    Chest/Lungs:  Normal Respiratory Rate    Heart:  Rate: Normal    Musculoskeletal:  Normal mobility      Anesthesia Plan  Type of Anesthesia, risks & benefits discussed:    Anesthesia Type: MAC  Intra-op Monitoring Plan: Standard ASA Monitors  Post Op Pain Control Plan: multimodal analgesia  Induction:  IV  Informed Consent: Informed consent signed with the Patient and all parties understand the risks and agree with anesthesia plan.  All questions answered.   ASA Score: 2  Day of Surgery Review of History & Physical: H&P Update referred to the surgeon/provider.  Anesthesia Plan Notes: Anesthesia plan was discussed with patient and/or representative. Risks and alternatives were discussed including the  possibility of alteration of plan.     Ready For Surgery From Anesthesia Perspective.     .

## 2024-08-30 NOTE — DISCHARGE SUMMARY
[facility] - [facility] Surgical Unit  [facility] Medicine  Discharge Summary      Patient Name: Lupis Peguero  MRN: [id number]  ADAMARIS: [id number]  Patient Class: IP- Inpatient  Admission Date: 8/28/2024  Hospital Length of Stay: 2 days  Discharge Date and Time:  08/30/2024 1:46 PM  Attending Physician: Lakisha Barreto DO   Discharging Provider: LAKISHA MIGUEL DO  Primary Care Provider: Chema Weldon DO    Primary Care Team: Networked reference to record PCT     HPI:   Patient is a 79y female who presented to outpatient clinic today with complaint of melena, BRBPR, and abdominal pain that started on 8/26/24. Past medical history significant for colonic polyps and HTN. Currently on no anticoagulation other than daily 81mg aspirin. Last dose of oral iron supplemental was greater than 2 weeks ago. Denies fever, nausea, or vomiting.     Directly admitted for further evaluation and surgical consultation for endoscopy to rule out suspected GI bleed. Will begin with CBC, CMP, lactate, UA, and CT abdomen w/without contrast.     Procedure(s) (LRB):  COLONOSCOPY (N/A)      Hospital Course:   8/29/24: Doing well today. Anemia is stable. Bowel prep today for colonoscopy tomorrow to try to identify source of blood loss.      8/30/24: Pt had colonoscopy. Found to have moderate to severe diverticulosis. Postop pt had no complaints.  was at bedside. No med changes made upon dc. All questions answered.     PE:   General: alert and oriented.  HEENT: NC, AT.  Resp: CTAB.  Cardio: RRR, no m/r/g. No edema.  Abd: soft, NT, ND, + BS x 4       Goals of Care Treatment Preferences:  Code Status: Full Code      SDOH Screening:  The patient was screened for utility difficulties, food insecurity, transport difficulties, housing insecurity, and interpersonal safety and there were no concerns identified this admission.     Consults:   Consults (From admission, onward)          Status Ordering Provider     Inpatient  consult to General Surgery  Once        Provider:  Socorro Ann MD    Acknowledged CHEMA FAY            Cardiac/Vascular  Hypertension  Chronic, controlled. Latest blood pressure and vitals reviewed-     Temp:  [97.9 °F (36.6 °C)-98.9 °F (37.2 °C)]   Pulse:  [61-68]   Resp:  [18-20]   BP: (147-160)/(67-74)   SpO2:  [96 %-98 %] .   Home meds for hypertension were reviewed and noted below.   Hypertension Medications               amLODIPine (NORVASC) 5 MG tablet Take 5 mg by mouth once daily.    losartan (COZAAR) 100 MG tablet Take 1 tablet (100 mg total) by mouth once daily.            While in the hospital, will manage blood pressure as follows; Continue home antihypertensive regimen    Will utilize p.r.n. blood pressure medication only if patient's blood pressure greater than 180/110 and she develops symptoms such as worsening chest pain or shortness of breath.    GI  Gastroesophageal reflux disease        Other  Insomnia  Continue trazodone.        Final Active Diagnoses:    Diagnosis Date Noted POA    Hypertension [I10] 06/07/2022 Yes    Insomnia [G47.00] 06/07/2022 Yes    Gastroesophageal reflux disease [K21.9] 06/07/2022 Yes      Problems Resolved During this Admission:    Diagnosis Date Noted Date Resolved POA    PRINCIPAL PROBLEM:  Melena [K92.1] 05/11/2023 08/30/2024 Yes    Acute blood loss anemia [D62] 08/29/2024 08/30/2024 Yes    Abdominal pain [R10.9] 08/28/2024 08/30/2024 Yes    Anxiety [F41.9] 06/07/2022 08/30/2024 Yes       Discharged Condition: good    Disposition: Home or Self Care    Follow Up:   Follow-up Information       Chema Fay DO .    Specialty: Family Medicine  Contact information:  [address] [facility] [address] [phone]                           Patient Instructions:   No discharge procedures on file.    Significant Diagnostic Studies: N/A    Pending Diagnostic Studies:       Procedure Component Value Units Date/Time    Stool Exam-Ova,Cysts,Parasites [[id number]]  Collected: 08/30/24 1119    Order Status: Sent Lab Status: In process Updated: 08/30/24 1224    Specimen: Stool            Medications:  Reconciled Home Medications:      Medication List        CONTINUE taking these medications      amLODIPine 5 MG tablet  Commonly known as: NORVASC  Take 5 mg by mouth once daily.     aspirin 81 MG EC tablet  Commonly known as: ECOTRIN  Take 81 mg by mouth once daily.     atorvastatin 20 MG tablet  Commonly known as: LIPITOR  TAKE 1 TABLET BY MOUTH EVERY DAY     coenzyme Q10 100 mg capsule  Take 100 mg by mouth once daily.     FLUoxetine 10 MG capsule  TAKE 1 CAPSULE BY MOUTH EVERY DAY     fluticasone propionate 50 mcg/actuation nasal spray  Commonly known as: FLONASE  USE 1 SPRAY IN EACH NOSTRIL ONCE DAILY     IRON-VITAMINS ORAL  Take by mouth.     losartan 100 MG tablet  Commonly known as: COZAAR  Take 1 tablet (100 mg total) by mouth once daily.     melatonin 10 mg Tab  Take by mouth.     montelukast 10 mg tablet  Commonly known as: SINGULAIR  TAKE 1 TABLET BY MOUTH EVERY DAY     omeprazole 40 MG capsule  Commonly known as: PRILOSEC  Take 1 capsule (40 mg total) by mouth once daily.     traZODone 100 MG tablet  Commonly known as: DESYREL  Take 1 tablet (100 mg total) by mouth every evening.     vitamin D 1000 units Tab  Commonly known as: VITAMIN D3  Take 1,000 Units by mouth once daily.              Indwelling Lines/Drains at time of discharge:   Lines/Drains/Airways       None                   Time spent on the discharge of patient: 35 minutes         SAE MIGUEL DO  Department of Hospital Medicine  [facility]

## 2024-08-30 NOTE — ANESTHESIA POSTPROCEDURE EVALUATION
Anesthesia Post Evaluation    Patient: Lupis Peguero    Procedure(s) Performed: Procedure(s) (LRB):  COLONOSCOPY (N/A)    Final Anesthesia Type: MAC      Patient location during evaluation: med/surg floor  Patient participation: Yes- Able to Participate  Level of consciousness: awake and alert  Post-procedure vital signs: reviewed and stable  Pain management: adequate  Airway patency: patent    PONV status at discharge: No PONV  Anesthetic complications: no      Cardiovascular status: blood pressure returned to baseline  Respiratory status: unassisted  Hydration status: euvolemic  Follow-up not needed.              Vitals Value Taken Time   /69 08/30/24 0800   Temp 36.8 °C (98.2 °F) 08/30/24 0800   Pulse 61 08/30/24 0800   Resp 18 08/30/24 0800   SpO2 97 % 08/30/24 0800         No case tracking events are documented in the log.      Pain/Anna Score: No data recorded

## 2024-08-30 NOTE — OP NOTE
Procedure date: 08/30/2024     Indications:  79-year-old white female admitted with suspicion of GI blood loss undergoing diagnostic colonoscopy for evaluation     Preoperative diagnosis: Suspicion of GI blood loss   Postoperative diagnosis moderate to severe diverticulosis     Procedure performed:  Diagnostic colonoscopy     Procedure in detail:  Patient was brought to the endoscopy suite laid in a left lateral decubitus position right side up.  Intravenous anesthesia provided.  Digital rectal exam performed exhibiting good anorectal tone and no masses.  An endoscope was then passed through the anus intubating the rectum and with gentle insufflation reaching the cecum.  Upon reaching the cecum pictures were taken the scope was then slowly withdrawn.  Overall the cecum ascending transverse descending and rectosigmoid colon all appeared to be grossly normal other then severe diverticulosis within the descending and sigmoid region.  No changes concerning for active bleeding or recent blood loss identified.  Patient did have some ova type debris noted throughout the colonoscopy.  Aspirates were for performed for evaluation.  They will be sent to micro.  Scope was retroflexed in the distal rectum revealing normal-appearing pain or mucosa no significant hemorrhoids.  Scope was removed in neutral position the patient was relieved of anesthesia stable condition and transferred to postanesthesia care unit.      Complications: None   Estimated blood loss: None   Specimens:  Fluid aspirate for micro evaluation     Disposition:  Upon recovering from anesthesia patient will be transferred back to the floor for further medical management         Socorro Ann MD

## 2024-08-30 NOTE — ASSESSMENT & PLAN NOTE
Anemia is likely due to acute blood loss which was from suspected GI bleed . Most recent hemoglobin and hematocrit are listed below.  Recent Labs     08/28/24  1418 08/29/24  0451   HGB 11.6* 11.7*   HCT 35.5* 34.9*     Plan  - Monitor serial CBC: Daily  - Transfuse PRBC if patient becomes hemodynamically unstable, symptomatic or H/H drops below 7/21.  - Patient has not received any PRBC transfusions to date  - Patient's anemia is currently stable  - Colonoscopy on 8/30/24

## 2024-08-30 NOTE — PLAN OF CARE
08/30/24 1053   Final Note   Assessment Type Final Discharge Note   Anticipated Discharge Disposition Home   What phone number can be called within the next 1-3 days to see how you are doing after discharge? [phone]   Hospital Resources/Appts/Education Provided Appointments scheduled and added to AVS   Post-Acute Status   Coverage Medicare   Discharge Delays None known at this time

## 2024-08-30 NOTE — SUBJECTIVE & OBJECTIVE
Review of Systems   Constitutional:  Negative for fatigue and fever.   Respiratory:  Negative for shortness of breath and wheezing.    Cardiovascular:  Negative for chest pain and palpitations.   Gastrointestinal:  Positive for blood in stool. Negative for abdominal pain, constipation and diarrhea.   Neurological:  Negative for dizziness and headaches.     Objective:     Vital Signs (Most Recent):  Temp: 98.2 °F (36.8 °C) (08/29/24 1600)  Pulse: 68 (08/29/24 1600)  Resp: 20 (08/29/24 1600)  BP: (!) 149/74 (08/29/24 1600)  SpO2: 98 % (08/29/24 1600) Vital Signs (24h Range):  Temp:  [97.8 °F (36.6 °C)-98.2 °F (36.8 °C)] 98.2 °F (36.8 °C)  Pulse:  [66-75] 68  Resp:  [18-20] 20  SpO2:  [95 %-98 %] 98 %  BP: (141-154)/(65-74) 149/74     Weight: 66.7 kg (147 lb 0.8 oz)  Body mass index is 26.9 kg/m².    Intake/Output Summary (Last 24 hours) at 8/29/2024 1913  Last data filed at 8/29/2024 1200  Gross per 24 hour   Intake 1620 ml   Output 0 ml   Net 1620 ml         Physical Exam  Vitals reviewed.   Constitutional:       General: She is not in acute distress.     Appearance: Normal appearance.   Cardiovascular:      Rate and Rhythm: Normal rate and regular rhythm.      Heart sounds: No murmur heard.     No friction rub. No gallop.   Pulmonary:      Effort: No respiratory distress.      Breath sounds: No wheezing, rhonchi or rales.   Abdominal:      General: Abdomen is flat. Bowel sounds are normal.      Palpations: Abdomen is soft.   Musculoskeletal:         General: No swelling, tenderness or deformity.      Right lower leg: No edema.      Left lower leg: No edema.   Skin:     General: Skin is warm and dry.      Findings: No lesion or rash.   Neurological:      General: No focal deficit present.      Mental Status: She is alert.   Psychiatric:         Mood and Affect: Mood normal.             Significant Labs: All pertinent labs within the past 24 hours have been reviewed.    Significant Imaging: I have reviewed all  pertinent imaging results/findings within the past 24 hours.

## 2024-08-30 NOTE — PROGRESS NOTES
[facility]  [facility] Medicine  Progress Note    Patient Name: Lupis Peguero  MRN: [id number]  Patient Class: IP- Inpatient   Admission Date: 8/28/2024  Length of Stay: 1 days  Attending Physician: Chema Weldon DO  Primary Care Provider: Chema Weldon DO        Subjective:     Principal Problem:Melena        HPI:  Patient is a 79y female who presented to outpatient clinic today with complaint of melena, BRBPR, and abdominal pain that started on 8/26/24. Past medical history significant for colonic polyps and HTN. Currently on no anticoagulation other than daily 81mg aspirin. Last dose of oral iron supplemental was greater than 2 weeks ago. Denies fever, nausea, or vomiting.     Directly admitted for further evaluation and surgical consultation for endoscopy to rule out suspected GI bleed. Will begin with CBC, CMP, lactate, UA, and CT abdomen w/without contrast.     Overview/Hospital Course:  8/29/24: Doing well today. Anemia is stable. Bowel prep today for colonoscopy tomorrow to try to identify source of blood loss.          Review of Systems   Constitutional:  Negative for fatigue and fever.   Respiratory:  Negative for shortness of breath and wheezing.    Cardiovascular:  Negative for chest pain and palpitations.   Gastrointestinal:  Positive for blood in stool. Negative for abdominal pain, constipation and diarrhea.   Neurological:  Negative for dizziness and headaches.     Objective:     Vital Signs (Most Recent):  Temp: 98.2 °F (36.8 °C) (08/29/24 1600)  Pulse: 68 (08/29/24 1600)  Resp: 20 (08/29/24 1600)  BP: (!) 149/74 (08/29/24 1600)  SpO2: 98 % (08/29/24 1600) Vital Signs (24h Range):  Temp:  [97.8 °F (36.6 °C)-98.2 °F (36.8 °C)] 98.2 °F (36.8 °C)  Pulse:  [66-75] 68  Resp:  [18-20] 20  SpO2:  [95 %-98 %] 98 %  BP: (141-154)/(65-74) 149/74     Weight: 66.7 kg (147 lb 0.8 oz)  Body mass index is 26.9 kg/m².    Intake/Output Summary (Last 24 hours) at 8/29/2024  1913  Last data filed at 8/29/2024 1200  Gross per 24 hour   Intake 1620 ml   Output 0 ml   Net 1620 ml         Physical Exam  Vitals reviewed.   Constitutional:       General: She is not in acute distress.     Appearance: Normal appearance.   Cardiovascular:      Rate and Rhythm: Normal rate and regular rhythm.      Heart sounds: No murmur heard.     No friction rub. No gallop.   Pulmonary:      Effort: No respiratory distress.      Breath sounds: No wheezing, rhonchi or rales.   Abdominal:      General: Abdomen is flat. Bowel sounds are normal.      Palpations: Abdomen is soft.   Musculoskeletal:         General: No swelling, tenderness or deformity.      Right lower leg: No edema.      Left lower leg: No edema.   Skin:     General: Skin is warm and dry.      Findings: No lesion or rash.   Neurological:      General: No focal deficit present.      Mental Status: She is alert.   Psychiatric:         Mood and Affect: Mood normal.             Significant Labs: All pertinent labs within the past 24 hours have been reviewed.    Significant Imaging: I have reviewed all pertinent imaging results/findings within the past 24 hours.    Assessment/Plan:      * Melena  CBC shows mild anemia. Will continue to trend.   -CT negative for inflammation  Surgery consulted for endoscopy on 8/30/24.       Acute blood loss anemia  Anemia is likely due to acute blood loss which was from suspected GI bleed . Most recent hemoglobin and hematocrit are listed below.  Recent Labs     08/28/24  1418 08/29/24  0451   HGB 11.6* 11.7*   HCT 35.5* 34.9*     Plan  - Monitor serial CBC: Daily  - Transfuse PRBC if patient becomes hemodynamically unstable, symptomatic or H/H drops below 7/21.  - Patient has not received any PRBC transfusions to date  - Patient's anemia is currently stable  - Colonoscopy on 8/30/24    Abdominal pain  CT negative for inflammation  Surgery consulted for endoscopy        VTE Risk Mitigation (From admission, onward)       None            Discharge Planning   LING:      Code Status: Prior   Is the patient medically ready for discharge?:     Reason for patient still in hospital (select all that apply): Treatment  Discharge Plan A: Home                  Chema Weldon DO  Department of Hospital Medicine   [facility]

## 2024-08-30 NOTE — PLAN OF CARE
Problem: Adult Inpatient Plan of Care  Goal: Plan of Care Review  8/30/2024 1359 by Vicenta Escobar RN  Outcome: Met  8/30/2024 1329 by Vicenta Escobar RN  Outcome: Progressing  Goal: Patient-Specific Goal (Individualized)  8/30/2024 1359 by Vicenta Escobar RN  Outcome: Met  8/30/2024 1329 by Vicenta Escobar RN  Outcome: Progressing  Goal: Absence of Hospital-Acquired Illness or Injury  8/30/2024 1359 by Vicenta Escobar RN  Outcome: Met  8/30/2024 1329 by Vicenta Escobar RN  Outcome: Progressing  Goal: Optimal Comfort and Wellbeing  8/30/2024 1359 by Vicenta Escobar RN  Outcome: Met  8/30/2024 1329 by Vicenta Escobar RN  Outcome: Progressing  Goal: Readiness for Transition of Care  8/30/2024 1359 by Vicenta Escobar RN  Outcome: Met  8/30/2024 1329 by Vicenta Escobar RN  Outcome: Progressing     Problem: Pain Acute  Goal: Optimal Pain Control and Function  8/30/2024 1359 by Vicenta Escobar RN  Outcome: Met  8/30/2024 1329 by Vicenta Escobar RN  Outcome: Progressing     Problem: Comorbidity Management  Goal: Blood Pressure in Desired Range  8/30/2024 1359 by Vicenta Escobar RN  Outcome: Met  8/30/2024 1329 by Vicenta Escobar RN  Outcome: Progressing     Problem: Sleep Disturbance  Goal: Adequate Sleep/Rest  8/30/2024 1359 by Vicenta Escobar RN  Outcome: Met  8/30/2024 1329 by Vicenta Escobar RN  Outcome: Progressing     Problem: Diarrhea  Goal: Effective Diarrhea Management  8/30/2024 1359 by Vicenta Escobar RN  Outcome: Met  8/30/2024 1329 by Vicenta Escobar RN  Outcome: Progressing     Problem: Fall Injury Risk  Goal: Absence of Fall and Fall-Related Injury  8/30/2024 1359 by Vicenta Escobar RN  Outcome: Met  8/30/2024 1329 by Vicenta Escobar RN  Outcome: Progressing

## 2024-08-30 NOTE — ASSESSMENT & PLAN NOTE
Chronic, controlled. Latest blood pressure and vitals reviewed-     Temp:  [97.9 °F (36.6 °C)-98.9 °F (37.2 °C)]   Pulse:  [61-68]   Resp:  [18-20]   BP: (147-160)/(67-74)   SpO2:  [96 %-98 %] .   Home meds for hypertension were reviewed and noted below.   Hypertension Medications               amLODIPine (NORVASC) 5 MG tablet Take 5 mg by mouth once daily.    losartan (COZAAR) 100 MG tablet Take 1 tablet (100 mg total) by mouth once daily.            While in the hospital, will manage blood pressure as follows; Continue home antihypertensive regimen    Will utilize p.r.n. blood pressure medication only if patient's blood pressure greater than 180/110 and she develops symptoms such as worsening chest pain or shortness of breath.

## 2024-08-30 NOTE — PLAN OF CARE
Problem: Adult Inpatient Plan of Care  Goal: Plan of Care Review  Outcome: Progressing  Goal: Patient-Specific Goal (Individualized)  Outcome: Progressing  Goal: Absence of Hospital-Acquired Illness or Injury  Outcome: Progressing  Goal: Optimal Comfort and Wellbeing  Outcome: Progressing  Goal: Readiness for Transition of Care  Outcome: Progressing     Problem: Pain Acute  Goal: Optimal Pain Control and Function  Outcome: Progressing     Problem: Comorbidity Management  Goal: Blood Pressure in Desired Range  Outcome: Progressing     Problem: Sleep Disturbance  Goal: Adequate Sleep/Rest  Outcome: Progressing     Problem: Diarrhea  Goal: Effective Diarrhea Management  Outcome: Progressing     Problem: Fall Injury Risk  Goal: Absence of Fall and Fall-Related Injury  Outcome: Progressing

## 2024-08-30 NOTE — HOSPITAL COURSE
8/29/24: Doing well today. Anemia is stable. Bowel prep today for colonoscopy tomorrow to try to identify source of blood loss.      8/30/24: Pt had colonoscopy. Found to have moderate to severe diverticulosis. Postop pt had no complaints.  was at bedside. No med changes made upon dc. All questions answered.     PE:   General: alert and oriented.  HEENT: NC, AT.  Resp: CTAB.  Cardio: RRR, no m/r/g. No edema.  Abd: soft, NT, ND, + BS x 4

## 2024-09-06 LAB — O+P STL MICRO: NORMAL

## 2024-10-02 ENCOUNTER — TELEPHONE (OUTPATIENT)
Dept: FAMILY MEDICINE | Facility: CLINIC | Age: 79
End: 2024-10-02
Payer: MEDICARE

## 2024-10-03 DIAGNOSIS — J30.9 ALLERGIC RHINITIS, UNSPECIFIED SEASONALITY, UNSPECIFIED TRIGGER: ICD-10-CM

## 2024-10-03 DIAGNOSIS — K21.9 GASTROESOPHAGEAL REFLUX DISEASE, UNSPECIFIED WHETHER ESOPHAGITIS PRESENT: ICD-10-CM

## 2024-10-03 RX ORDER — MONTELUKAST SODIUM 10 MG/1
10 TABLET ORAL
Qty: 90 TABLET | Refills: 3 | Status: SHIPPED | OUTPATIENT
Start: 2024-10-03

## 2024-10-03 RX ORDER — OMEPRAZOLE 40 MG/1
40 CAPSULE, DELAYED RELEASE ORAL
Qty: 90 CAPSULE | Refills: 3 | Status: SHIPPED | OUTPATIENT
Start: 2024-10-03

## 2024-10-28 ENCOUNTER — OFFICE VISIT (OUTPATIENT)
Dept: FAMILY MEDICINE | Facility: CLINIC | Age: 79
End: 2024-10-28
Payer: MEDICARE

## 2024-10-28 VITALS
TEMPERATURE: 98 F | HEIGHT: 62 IN | SYSTOLIC BLOOD PRESSURE: 144 MMHG | BODY MASS INDEX: 26.9 KG/M2 | HEART RATE: 72 BPM | OXYGEN SATURATION: 97 % | DIASTOLIC BLOOD PRESSURE: 71 MMHG | WEIGHT: 146.19 LBS

## 2024-10-28 DIAGNOSIS — E78.2 MIXED HYPERLIPIDEMIA: Primary | ICD-10-CM

## 2024-10-28 DIAGNOSIS — M79.10 MYALGIA DUE TO STATIN: ICD-10-CM

## 2024-10-28 DIAGNOSIS — T46.6X5A MYALGIA DUE TO STATIN: ICD-10-CM

## 2024-10-28 NOTE — H&P
[facility] - Medical Surgical Unit  [facility] Medicine  History & Physical    Patient Name: Lupis Peguero  MRN: [id number]  Patient Class: IP- Inpatient  Admission Date: 8/28/2024  Attending Physician: Chema Weldon DO   Primary Care Provider: Chema Weldon DO         Patient information was obtained from patient, past medical records, and ER records.     Subjective:     Principal Problem:Melena    Chief Complaint:   Chief Complaint   Patient presents with    Melena        HPI: Patient is a 79y female who presented to outpatient clinic today with complaint of melena, BRBPR, and abdominal pain that started on 8/26/24. Past medical history significant for colonic polyps and HTN. Currently on no anticoagulation other than daily 81mg aspirin. Last dose of oral iron supplemental was greater than 2 weeks ago. Denies fever, nausea, or vomiting.     Directly admitted for further evaluation and surgical consultation for endoscopy to rule out suspected GI bleed. Will begin with CBC, CMP, lactate, UA, and CT abdomen w/without contrast.     Past Medical History:   Diagnosis Date    Amnesia     Anxiety disorder, unspecified     GERD (gastroesophageal reflux disease)     HTN (hypertension)     Insomnia     Major depression, recurrent     Mass of right breast     Mixed hyperlipidemia     Personal history of colonic polyps 05/11/2023    s/p polypectomy - Dr Han Brown    Vitamin D deficiency        Past Surgical History:   Procedure Laterality Date    APPENDECTOMY  1959    ASPIRATION OF SYNOVIAL CYST Left 09/01/2016    hand    BACK SURGERY  1996    COLONOSCOPY  10/18/2017    COLONOSCOPY  1997    COLONOSCOPY N/A 05/11/2023    Dr Han Brown    EYE SURGERY Bilateral     CATARACT    LIGATION, HEMORRHOIDS N/A 05/11/2023    Dr Han Brown    MASS EXCISION Left 09/01/2016    thigh       Review of patient's allergies indicates:  No Known Allergies    Current Facility-Administered Medications on  "[facility]    Medicine Progress Note - Hospitalist Service, GOLD TEAM 16    Date of Admission:  10/25/2024    Assessment & Plan     Clarke Moreira is a 76 year old male with history of DMII, CKD, HFpEF, A fib, depression, THONG, chronic anemia, hypothyroidism, HLD, and BPH who was admitted to [facility] 10/25/2024 with mechanical fall, MICHELLE, and inability to care for himself. PTA fell walking to the kitchen due to knees buckling. No LOC, did not hit head.  Of note, recently admitted to [facility] 9/22 to 10/3 with similar presentation and was found to have citrobacter bacteremia. Recently discharged from TCU but struggled to care for self, perform ADLs, and limited PO intake since discharge.        10/28  - had good BM post enema  - still cannot walk states due to weakness and some  pain in knees  - was actually happy to see psych  - awaiting placement, patient  is ok with this, states cannot go home this  way and the way his house is-   - restarted  torsemide and KCl at lower dose   -restarted metformin   - care management will let us know if patient  is appropriate for retirement care and then new encounter will be provided and would  need to be change carlos to retirement care       Depression:  - 10/26 told me he has very bad depression and anxiety , told physical therapy  \" \"be able to go home and sell belongs and prepare to die\"   - denies suicidal thoughts but states \" its too early\"  - tells me he has been living with these thoughts for over 20 years  - tells me he had seen psychiatrist before and he tells  me he was told that he Is better off not telling anyone about these thoughts  - tells me he has tried medications before that did not work  - he got upset that I told him that I asked psychiatrist to see him   and said \" you have to cover your a...\"  - 10/28 told me that he was actually happy to speak with psychiatry        Mechanical fall  Lower back pain    Bilateral knee " " hip pain   -Head CT and cervical/thoracic/lumbar spine CT no acute findings.   -XR R knee and pelvis no acute findings. I told patient  they did right sided XR as when he came in he complained of pain on right side , he told me \" well that is what you been told but that is not what I said\"   - complains of  left hip pain post fall,  XR  shows no fracture , discussed with  patient    - UA negative for signs of infection   - BCx x1 10/25, no growth to date   - PT, OT, nutrition consults   - Pain management with Tylenol and Lidoderm patches     Hypotension:   -BP 80s-110s/50s-70s in the ED, responded to IV fluids    - Encourage oral intake  - Infectious workup   - blood pressure  now good  - monitor with restarting heart failure  medications       Failure to thrive  - has not had good appetite, not able to cook an dcare for himself  - nutritionist to saw  - physical therapy  Occupational therapy    -      Hypertension    -blood pressure  improved  so metoprolol  resumed with holding parameters      HFpEF:   -Last echo 3/2021 technically difficult, EF 50-55%, global RV function mildly reduced with mild RV dilation. PTA on Metoprolol back on it   -PTA  Spironolactone, Torsemide 80 mg daily, Kcl all on hold, monitor .   - staring to have some leg edema , so  restarting   torsemide at 40mg ( on 80 at home )  for now and adjust as needed and montior renal functions , also stared Kcl at 20, takes 40 bid at home   - Hold spironolactone for now,   - Echo  10/25:  EF 55-60%       Paroxysmal A fib  Long term anticoagulation   -CHADSVASC 5. PTA on Coumadin and Metoprolol.   - Coumadin per pharmacy  - Continue metoprolol with hold parameters     Elevated troponin  - trop flat  - no symptoms   - EKG atrial fibrillation  no acute st t wave changes     Chronic hypercapnic respiratory failure   THONG:   -Continue CPAP   - no current issues     AGMA:  - resolved      MICHELLE on CKD III:   -baseline Cr around 1. Elevated to " File Prior to Encounter   Medication    0.9%  NaCl infusion     Current Outpatient Medications on File Prior to Encounter   Medication Sig    amLODIPine (NORVASC) 5 MG tablet Take 5 mg by mouth once daily.    atorvastatin (LIPITOR) 20 MG tablet TAKE 1 TABLET BY MOUTH EVERY DAY    coenzyme Q10 100 mg capsule Take 100 mg by mouth once daily.    FLUoxetine 10 MG capsule TAKE 1 CAPSULE BY MOUTH EVERY DAY    losartan (COZAAR) 100 MG tablet Take 1 tablet (100 mg total) by mouth once daily.    melatonin 10 mg Tab Take by mouth.    montelukast (SINGULAIR) 10 mg tablet TAKE 1 TABLET BY MOUTH EVERY DAY    omeprazole (PRILOSEC) 40 MG capsule Take 1 capsule (40 mg total) by mouth once daily.    traZODone (DESYREL) 100 MG tablet Take 1 tablet (100 mg total) by mouth every evening.    vitamin D (VITAMIN D3) 1000 units Tab Take 1,000 Units by mouth once daily.    aspirin (ECOTRIN) 81 MG EC tablet Take 81 mg by mouth once daily.    fluticasone propionate (FLONASE) 50 mcg/actuation nasal spray USE 1 SPRAY IN EACH NOSTRIL ONCE DAILY    vit B-comp w-Fe,Ca,FA<1mg (IRON-VITAMINS ORAL) Take by mouth.     Family History       Problem Relation (Age of Onset)    Coronary artery disease Mother    Diabetes Mellitus Father    Heart attacks under age 50 Son (45)    Hyperlipidemia Mother, Father    Hypertension Mother, Father    Pulmonary embolism Mother          Tobacco Use    Smoking status: Never    Smokeless tobacco: Never   Substance and Sexual Activity    Alcohol use: Never    Drug use: Never    Sexual activity: Not Currently     Review of Systems   Constitutional:  Negative for chills and fever.   HENT:  Negative for congestion and sore throat.    Respiratory:  Negative for shortness of breath and wheezing.    Cardiovascular:  Negative for chest pain and palpitations.   Gastrointestinal:  Positive for abdominal pain, anal bleeding, blood in stool and diarrhea. Negative for constipation, nausea, rectal pain and vomiting.   Musculoskeletal:   Negative for back pain and myalgias.   Skin:  Negative for rash and wound.   Neurological:  Positive for dizziness.     Objective:     Vital Signs (Most Recent):  Temp: 97.9 °F (36.6 °C) (08/28/24 1350)  Pulse: 74 (08/28/24 1350)  Resp: 18 (08/28/24 1350)  BP: (!) 146/72 (08/28/24 1350)  SpO2: 96 % (08/28/24 1350) Vital Signs (24h Range):  Temp:  [97.9 °F (36.6 °C)-98.1 °F (36.7 °C)] 97.9 °F (36.6 °C)  Pulse:  [70-74] 74  Resp:  [18] 18  SpO2:  [96 %-98 %] 96 %  BP: (138-146)/(68-72) 146/72     Weight: 66.7 kg (147 lb 0.8 oz)  Body mass index is 26.9 kg/m².     Physical Exam  Vitals reviewed.   Constitutional:       General: She is not in acute distress.     Appearance: Normal appearance.   Eyes:      Extraocular Movements: EOM normal.      Pupils: Pupils are equal, round, and reactive to light.   Cardiovascular:      Rate and Rhythm: Normal rate and regular rhythm.      Heart sounds: No murmur heard.     No friction rub. No gallop.   Pulmonary:      Effort: No respiratory distress.      Breath sounds: No wheezing, rhonchi or rales.   Abdominal:      General: Abdomen is flat. Bowel sounds are normal.      Palpations: Abdomen is soft.   Musculoskeletal:         General: No swelling, tenderness or deformity.      Right lower leg: No edema.      Left lower leg: No edema.   Skin:     General: Skin is warm and dry.      Findings: No lesion or rash.   Neurological:      General: No focal deficit present.      Mental Status: She is alert.   Psychiatric:         Mood and Affect: Mood normal.              CRANIAL NERVES     CN I  cranial nerve I not tested    CN II   Visual fields full to confrontation.     CN III, IV, VI   Pupils are equal, round, and reactive to light.  Extraocular motions are normal.     CN V   Facial sensation intact.     CN VII   Facial expression full, symmetric.     CN VIII   CN VIII normal.     CN IX, X   CN IX normal.   CN X normal.     CN XI   CN XI normal.        Significant Labs: All pertinent  "1.77 10/25, BUN 23.1. Likely prerenal in the setting, on diuretics  of poor oral intake.   - received IV fluids  and now creatinine close to baseline   - slowly restarting diuretics , monitor renal functions   -  UA negative for infection  - CK 75   - avoid nephrotoxic agents     Constipation  - states has not had BM since admitted to TCU, did have some watery stool Thursday suspect due to overflow diarrhea   - abdominal XR  does show moderate  to large  colonic stool burden , needs aggressive bowel regime, enemas , laxatives  , discussed with  patient         T2DM with neuropathy :   - ling standing left lateral thigh numbness  -A1c 6.2 10/7. PTA on Metformin only.   - restarted  Metformin  500 mg bid 10/28, accu checks bid       HLD:  - Continue PTA ASA and statin    Depression, anxiety :   No PTA meds.  -feels very depressed, also feels anxious   - will ask psych to see on Monday     Chronic microcytic anemia:   - baseline  hgb 10.5-12 and stable.   - Iron low to 14 9/22/2024.   -PTA on iron, continue, was started on last admssion  - out patient  GI work up      Hypothyroidism:   TSH 0.93 10/7/24. Continue synthroid       Osteopenia on XR  - defer back to PMD   - vit d levels were nl last admisiosn      BPH:   Continue Flomax    Chronic wounds:   Per notes, 2/2 DM. [facility] consulted    Peridontal disease  -  during last admission was seen by dentistry,  on evaluation \"possible root tip maxillary left second molar (#15). Dentistry recs:- start chlorhexidine oral rinse BID for 4-8 weeks  - follow up with hospital dental clinic in [facility] for further evaluation.\"     MDD  CHRIS  - not on medications PTA, see above       Diet:  Regular  DVT Prophylaxis: Coumadin per pharmacy, mechanical   Bazzi Catheter: Not present  Lines: None     Cardiac Monitoring: None  Code Status:  DNR/DNI, pressors ok    Clinically Significant Risk Factors Present on Admission           # Hypochloremia: Lowest Cl = 94 mmol/L in last 2 days, " labs within the past 24 hours have been reviewed.    Significant Imaging: I have reviewed all pertinent imaging results/findings within the past 24 hours.  Assessment/Plan:     * Melena  CBC shows mild anemia. Will continue to trend.   -CT negative for inflammation  Surgery consulted for endoscopy on 8/30/24.       Abdominal pain  CT negative for inflammation  Surgery consulted for endoscopy        VTE Risk Mitigation (From admission, onward)      None                            Chema Weldon DO  Department of Hospital Medicine  [facility]           will monitor as appropriate     # Anion Gap Metabolic Acidosis: Highest Anion Gap = 20 mmol/L in last 2 days, will monitor and treat as appropriate   # Drug Induced Coagulation Defect: home medication list includes an anticoagulant medication  # Drug Induced Platelet Defect: home medication list includes an antiplatelet medication  # Acute Kidney Injury, unspecified: based on a >150% or 0.3 mg/dL increase in last creatinine compared to past 90 day average, will monitor renal function  # Hypertension: Noted on problem list     # Severe Obesity: Estimated body mass index is 48.82 kg/m  as calculated from the following:    Height as of this encounter: 1.829 m (6').    Weight as of this encounter: 163.3 kg (360 lb).       # Financial/Environmental Concerns:         Disposition Plan      Medically Ready for Discharge: Anticipated in 2-4 Days        Diet: Combination Diet Regular Diet    DVT Prophylaxis: Warfarin  Bazzi Catheter: Not present  Lines: None     Cardiac Monitoring: None  Code Status: No CPR- Do NOT Intubate      Clinically Significant Risk Factors Present on Admission        # Hypokalemia: Lowest K = 3 mmol/L in last 2 days, will replace as needed   # Hypochloremia: Lowest Cl = 96 mmol/L in last 2 days, will monitor as appropriate         # Drug Induced Coagulation Defect: home medication list includes an anticoagulant medication  # Drug Induced Platelet Defect: home medication list includes an antiplatelet medication   # Hypertension: Noted on problem list  # Chronic heart failure with preserved ejection fraction: heart failure noted on problem list and last echo with EF >50%        # Severe Obesity: Estimated body mass index is 46.55 kg/m  as calculated from the following:    Height as of this encounter: 1.829 m (6').    Weight as of this encounter: 155.7 kg (343 lb 4.1 oz).         # Financial/Environmental Concerns: insurance inadequate         Disposition Plan     Medically Ready for Discharge:  Anticipated in 2-4 Days           Lucila Sorensen MD  Hospitalist Service, GOLD TEAM 16  [facility]  Securely message with Pharm[employer] (more info)  Text page via [employer] Paging/Directory   See signed in provider for up to date coverage information  ______________________________________________________________________    Interval History   Doing ok, no nausea vomiting, had good response to enema , no abdominal pain ,no chest pain      Physical Exam   Vital Signs: Temp: 98.1  F (36.7  C) Temp src: Oral BP: 117/62 Pulse: 93   Resp: 18 SpO2: 95 % O2 Device: None (Room air)    Weight: 343 lbs 4.1 oz  General appearence: awake alert  in  no apparent distress    RESPIRATORY: lungs clear    CARDIOVASCULAR:S1 S2 irreg   GASTROINTESTINAL:soft, non-distended , non-tender , + bowel sounds,    SKIN: warm and dry, no mottling noted , has scrapes over left knee   NEUROLOGIC; awake alert and oriented,   EXTREMITIES: no clubbing, cyanosis, +  edema         Data     I have personally reviewed the following data over the past 24 hrs:    8.3  \   11.5 (L)   / 279     136 101 10.7 /  114 (H)   3.8 25 0.90 \     INR:  2.06 (H) PTT:  N/A   D-dimer:  N/A Fibrinogen:  N/A       Imaging results reviewed over the past 24 hrs:   No results found for this or any previous visit (from the past 24 hours).

## 2025-01-16 ENCOUNTER — HOSPITAL ENCOUNTER (OUTPATIENT)
Dept: RADIOLOGY | Facility: HOSPITAL | Age: 80
Discharge: HOME | End: 2025-01-16
Attending: NURSE PRACTITIONER
Payer: MEDICARE

## 2025-01-16 DIAGNOSIS — I65.23 CAROTID STENOSIS, BILATERAL: ICD-10-CM

## 2025-01-16 PROCEDURE — 93880 EXTRACRANIAL BILAT STUDY: CPT | Mod: TC

## 2025-01-20 DIAGNOSIS — F41.9 ANXIETY: ICD-10-CM

## 2025-01-23 RX ORDER — FLUOXETINE 10 MG/1
CAPSULE ORAL
Qty: 90 CAPSULE | Refills: 1 | Status: SHIPPED | OUTPATIENT
Start: 2025-01-23

## 2025-02-10 ENCOUNTER — OFFICE VISIT (OUTPATIENT)
Dept: FAMILY MEDICINE | Facility: CLINIC | Age: 80
End: 2025-02-10
Payer: MEDICARE

## 2025-02-10 VITALS
TEMPERATURE: 97 F | HEART RATE: 85 BPM | RESPIRATION RATE: 18 BRPM | HEIGHT: 62 IN | WEIGHT: 149.63 LBS | OXYGEN SATURATION: 98 % | DIASTOLIC BLOOD PRESSURE: 74 MMHG | BODY MASS INDEX: 27.53 KG/M2 | SYSTOLIC BLOOD PRESSURE: 136 MMHG

## 2025-02-10 DIAGNOSIS — R15.9 INCONTINENCE OF FECES, UNSPECIFIED FECAL INCONTINENCE TYPE: Primary | ICD-10-CM

## 2025-02-10 RX ORDER — LOPERAMIDE HYDROCHLORIDE 2 MG/1
2 CAPSULE ORAL 4 TIMES DAILY PRN
Qty: 120 CAPSULE | Refills: 3 | Status: SHIPPED | OUTPATIENT
Start: 2025-02-10 | End: 2025-06-10

## 2025-02-10 NOTE — PROGRESS NOTES
"   Patient ID: [id number]     Chief Complaint: Follow-up (6 month follow. ) and Hemorrhoids (Taking about an hour to have BM describes as "mushy" soft. Started about a year ago happens on and off. )    HPI:     Lupis Peguero is a 79 y.o. female here today for Follow-up (6 month follow. ) and Hemorrhoids (Taking about an hour to have BM describes as "mushy" soft. Started about a year ago happens on and off. ).     -------------------------------------    Amnesia    Anxiety disorder, unspecified    GERD (gastroesophageal reflux disease)    HTN (hypertension)    Insomnia    Major depression, recurrent    Mass of right breast    Mixed hyperlipidemia    Personal history of colonic polyps    s/p polypectomy - Dr Han Brown    Vitamin D deficiency        Past Surgical History:   Procedure Laterality Date    APPENDECTOMY  1959    ASPIRATION OF SYNOVIAL CYST Left 09/01/2016    hand    BACK SURGERY  1996    COLONOSCOPY  10/18/2017    COLONOSCOPY  1997    COLONOSCOPY N/A 05/11/2023    Dr Han Brown    COLONOSCOPY N/A 8/30/2024    Procedure: COLONOSCOPY;  Surgeon: Socorro Ann MD;  Location: [facility];  Service: General;  Laterality: N/A;    EYE SURGERY Bilateral     CATARACT    LIGATION, HEMORRHOIDS N/A 05/11/2023    Dr Han Brown    MASS EXCISION Left 09/01/2016    thigh       Review of patient's allergies indicates:  No Known Allergies    Outpatient Medications Marked as Taking for the 2/10/25 encounter (Office Visit) with Chema Weldon,    Medication Sig Dispense Refill    amLODIPine (NORVASC) 5 MG tablet Take 5 mg by mouth once daily.      aspirin (ECOTRIN) 81 MG EC tablet Take 81 mg by mouth once daily.      coenzyme Q10 100 mg capsule Take 100 mg by mouth once daily.      FLUoxetine 10 MG capsule TAKE 1 CAPSULE BY MOUTH EVERY DAY 90 capsule 1    fluticasone propionate (FLONASE) 50 mcg/actuation nasal spray USE 1 SPRAY IN EACH NOSTRIL ONCE DAILY 48 mL 1    losartan (COZAAR) 100 MG tablet " Take 1 tablet (100 mg total) by mouth once daily. 30 tablet 1    melatonin 10 mg Tab Take by mouth.      montelukast (SINGULAIR) 10 mg tablet TAKE 1 TABLET BY MOUTH EVERY DAY 90 tablet 3    omeprazole (PRILOSEC) 40 MG capsule TAKE 1 CAPSULE BY MOUTH EVERY DAY 90 capsule 3    traZODone (DESYREL) 100 MG tablet Take 1 tablet (100 mg total) by mouth every evening. 90 tablet 5    vit B-comp w-Fe,Ca,FA<1mg (IRON-VITAMINS ORAL) Take by mouth.      vitamin D (VITAMIN D3) 1000 units Tab Take 1,000 Units by mouth once daily.         Social History     Socioeconomic History    Marital status:    Tobacco Use    Smoking status: Never    Smokeless tobacco: Never   Substance and Sexual Activity    Alcohol use: Never    Drug use: Never    Sexual activity: Not Currently     Social Drivers of Health     Financial Resource Strain: Low Risk  (8/28/2024)    Overall Financial Resource Strain (CARDIA)     Difficulty of Paying Living Expenses: Not hard at all   Food Insecurity: No Food Insecurity (8/28/2024)    Hunger Vital Sign     Worried About Running Out of Food in the Last Year: Never true     Ran Out of Food in the Last Year: Never true   Transportation Needs: No Transportation Needs (8/28/2024)    TRANSPORTATION NEEDS     Transportation : No   Physical Activity: Sufficiently Active (8/28/2024)    Exercise Vital Sign     Days of Exercise per Week: 6 days     Minutes of Exercise per Session: 30 min   Stress: Stress Concern Present (8/28/2024)    [employer] - Occupational Stress Questionnaire     Feeling of Stress : To some extent   Housing Stability: Unknown (8/28/2024)    Housing Stability Vital Sign     Unable to Pay for Housing in the Last Year: No     Homeless in the Last Year: No        Family History   Problem Relation Name Age of Onset    Coronary artery disease Mother          CABG    Pulmonary embolism Mother          cause of death    Hypertension Mother      Hyperlipidemia Mother       "Diabetes Mellitus Father      Hypertension Father      Hyperlipidemia Father      Heart attacks under age 50 Son  45        cause of death        Patient Care Team:  Chema Weldon DO as PCP - General (Family Medicine)  Kelby Butcher MD (Ophthalmology)  Han Brown MD as Consulting Physician (Gastroenterology)  Des Felipe FNP as Nurse Practitioner (Cardiology)     Subjective:     Review of Systems   Constitutional:  Negative for chills and fever.   Respiratory:  Negative for shortness of breath.    Cardiovascular:  Negative for chest pain.   Gastrointestinal:  Positive for diarrhea. Negative for constipation.        Incontinence of feces.    Neurological:  Negative for headaches.   Psychiatric/Behavioral:  The patient does not have insomnia.        See HPI for details  All Other ROS: Negative except as stated in HPI.       Objective:     /74 (BP Location: Left arm, Patient Position: Sitting)   Pulse 85   Temp 97.4 °F (36.3 °C)   Resp 18   Ht 5' 2" (1.575 m)   Wt 67.9 kg (149 lb 9.6 oz)   SpO2 98%   BMI 27.36 kg/m²     Physical Exam  Vitals reviewed.   Constitutional:       General: She is not in acute distress.     Appearance: Normal appearance.   Cardiovascular:      Rate and Rhythm: Normal rate and regular rhythm.      Heart sounds: No murmur heard.     No friction rub. No gallop.   Pulmonary:      Effort: No respiratory distress.      Breath sounds: No wheezing, rhonchi or rales.   Musculoskeletal:         General: No swelling, tenderness or deformity.      Right lower leg: No edema.      Left lower leg: No edema.   Skin:     General: Skin is warm and dry.      Findings: No lesion or rash.   Neurological:      General: No focal deficit present.      Mental Status: She is alert.   Psychiatric:         Mood and Affect: Mood normal.         Assessment/Plan:     1. Incontinence of feces, unspecified fecal incontinence type  -     loperamide (IMODIUM) 2 mg capsule; Take 1 " capsule (2 mg total) by mouth 4 (four) times daily as needed for Diarrhea.  Dispense: 120 capsule; Refill: 3        Follow up:     Follow up if symptoms worsen or fail to improve. In addition to their scheduled follow up, the patient has also been instructed to follow up on as needed basis.

## 2025-05-22 DIAGNOSIS — Z00.00 WELLNESS EXAMINATION: Primary | ICD-10-CM

## 2025-05-22 DIAGNOSIS — Z11.4 SCREENING FOR HIV (HUMAN IMMUNODEFICIENCY VIRUS): ICD-10-CM

## 2025-06-05 ENCOUNTER — LAB VISIT (OUTPATIENT)
Dept: LAB | Facility: HOSPITAL | Age: 80
End: 2025-06-05
Attending: FAMILY MEDICINE
Payer: MEDICARE

## 2025-06-05 DIAGNOSIS — Z11.4 SCREENING FOR HIV (HUMAN IMMUNODEFICIENCY VIRUS): ICD-10-CM

## 2025-06-05 DIAGNOSIS — Z00.00 WELLNESS EXAMINATION: ICD-10-CM

## 2025-06-05 LAB
ALBUMIN SERPL-MCNC: 3.7 G/DL (ref 3.4–4.8)
ALBUMIN/GLOB SERPL: 1.1 RATIO (ref 1.1–2)
ALP SERPL-CCNC: 77 UNIT/L (ref 40–150)
ALT SERPL-CCNC: 31 UNIT/L (ref 0–55)
ANION GAP SERPL CALC-SCNC: 9 MEQ/L
AST SERPL-CCNC: 25 UNIT/L (ref 11–45)
BASOPHILS # BLD AUTO: 0.03 X10(3)/MCL
BASOPHILS NFR BLD AUTO: 0.5 %
BILIRUB SERPL-MCNC: 0.4 MG/DL
BUN SERPL-MCNC: 16 MG/DL (ref 9.8–20.1)
CALCIUM SERPL-MCNC: 9.3 MG/DL (ref 8.4–10.2)
CHLORIDE SERPL-SCNC: 106 MMOL/L (ref 98–107)
CHOLEST SERPL-MCNC: 252 MG/DL
CHOLEST/HDLC SERPL: 5 {RATIO} (ref 0–5)
CO2 SERPL-SCNC: 29 MMOL/L (ref 23–31)
CREAT SERPL-MCNC: 0.82 MG/DL (ref 0.55–1.02)
CREAT/UREA NIT SERPL: 20
EOSINOPHIL # BLD AUTO: 0.15 X10(3)/MCL (ref 0–0.9)
EOSINOPHIL NFR BLD AUTO: 2.5 %
ERYTHROCYTE [DISTWIDTH] IN BLOOD BY AUTOMATED COUNT: 12.6 % (ref 11.5–17)
GFR SERPLBLD CREATININE-BSD FMLA CKD-EPI: >60 ML/MIN/1.73/M2
GLOBULIN SER-MCNC: 3.5 GM/DL (ref 2.4–3.5)
GLUCOSE SERPL-MCNC: 101 MG/DL (ref 82–115)
HCT VFR BLD AUTO: 39.8 % (ref 37–47)
HCV AB SERPL QL IA: NONREACTIVE
HDLC SERPL-MCNC: 55 MG/DL (ref 35–60)
HGB BLD-MCNC: 12.9 G/DL (ref 12–16)
HIV 1+2 AB+HIV1 P24 AG SERPL QL IA: NONREACTIVE
IMM GRANULOCYTES # BLD AUTO: 0.02 X10(3)/MCL (ref 0–0.04)
IMM GRANULOCYTES NFR BLD AUTO: 0.3 %
LDLC SERPL CALC-MCNC: 161 MG/DL (ref 50–140)
LYMPHOCYTES # BLD AUTO: 1.86 X10(3)/MCL (ref 0.6–4.6)
LYMPHOCYTES NFR BLD AUTO: 30.9 %
MCH RBC QN AUTO: 30 PG (ref 27–31)
MCHC RBC AUTO-ENTMCNC: 32.4 G/DL (ref 33–36)
MCV RBC AUTO: 92.6 FL (ref 80–94)
MONOCYTES # BLD AUTO: 0.65 X10(3)/MCL (ref 0.1–1.3)
MONOCYTES NFR BLD AUTO: 10.8 %
NEUTROPHILS # BLD AUTO: 3.31 X10(3)/MCL (ref 2.1–9.2)
NEUTROPHILS NFR BLD AUTO: 55 %
NRBC BLD AUTO-RTO: 0 %
PLATELET # BLD AUTO: 241 X10(3)/MCL (ref 130–400)
PMV BLD AUTO: 9.6 FL (ref 7.4–10.4)
POTASSIUM SERPL-SCNC: 4.4 MMOL/L (ref 3.5–5.1)
PROT SERPL-MCNC: 7.2 GM/DL (ref 5.8–7.6)
RBC # BLD AUTO: 4.3 X10(6)/MCL (ref 4.2–5.4)
SODIUM SERPL-SCNC: 144 MMOL/L (ref 136–145)
TRIGL SERPL-MCNC: 182 MG/DL (ref 37–140)
TSH SERPL-ACNC: 1.56 UIU/ML (ref 0.35–4.94)
VLDLC SERPL CALC-MCNC: 36 MG/DL
WBC # BLD AUTO: 6.02 X10(3)/MCL (ref 4.5–11.5)

## 2025-06-05 PROCEDURE — 87389 HIV-1 AG W/HIV-1&-2 AB AG IA: CPT

## 2025-06-05 PROCEDURE — 80061 LIPID PANEL: CPT

## 2025-06-05 PROCEDURE — 84443 ASSAY THYROID STIM HORMONE: CPT

## 2025-06-05 PROCEDURE — 36415 COLL VENOUS BLD VENIPUNCTURE: CPT

## 2025-06-05 PROCEDURE — 86803 HEPATITIS C AB TEST: CPT

## 2025-06-05 PROCEDURE — 85025 COMPLETE CBC W/AUTO DIFF WBC: CPT

## 2025-06-05 PROCEDURE — 80053 COMPREHEN METABOLIC PANEL: CPT

## 2025-06-09 ENCOUNTER — OFFICE VISIT (OUTPATIENT)
Dept: FAMILY MEDICINE | Facility: CLINIC | Age: 80
End: 2025-06-09
Payer: MEDICARE

## 2025-06-09 VITALS
WEIGHT: 150.81 LBS | HEART RATE: 72 BPM | RESPIRATION RATE: 20 BRPM | HEIGHT: 62 IN | TEMPERATURE: 98 F | DIASTOLIC BLOOD PRESSURE: 65 MMHG | BODY MASS INDEX: 27.75 KG/M2 | SYSTOLIC BLOOD PRESSURE: 167 MMHG | OXYGEN SATURATION: 96 %

## 2025-06-09 DIAGNOSIS — E78.2 MIXED HYPERLIPIDEMIA: ICD-10-CM

## 2025-06-09 DIAGNOSIS — M81.0 AGE-RELATED OSTEOPOROSIS WITHOUT CURRENT PATHOLOGICAL FRACTURE: ICD-10-CM

## 2025-06-09 DIAGNOSIS — F33.1 MODERATE EPISODE OF RECURRENT MAJOR DEPRESSIVE DISORDER: ICD-10-CM

## 2025-06-09 DIAGNOSIS — I35.1 NONRHEUMATIC AORTIC (VALVE) INSUFFICIENCY: ICD-10-CM

## 2025-06-09 DIAGNOSIS — R06.09 DYSPNEA ON EXERTION: ICD-10-CM

## 2025-06-09 DIAGNOSIS — I65.23 BILATERAL CAROTID ARTERY STENOSIS: ICD-10-CM

## 2025-06-09 DIAGNOSIS — Z00.00 MEDICARE ANNUAL WELLNESS VISIT, SUBSEQUENT: Primary | ICD-10-CM

## 2025-06-09 DIAGNOSIS — Z53.20 MAMMOGRAM DECLINED: ICD-10-CM

## 2025-06-09 DIAGNOSIS — I10 PRIMARY HYPERTENSION: ICD-10-CM

## 2025-06-09 PROBLEM — T46.6X5A MYALGIA DUE TO STATIN: Status: RESOLVED | Noted: 2024-10-28 | Resolved: 2025-06-09

## 2025-06-09 PROBLEM — M79.10 MYALGIA DUE TO STATIN: Status: RESOLVED | Noted: 2024-10-28 | Resolved: 2025-06-09

## 2025-06-09 RX ORDER — ATORVASTATIN CALCIUM 20 MG/1
20 TABLET, FILM COATED ORAL DAILY
Qty: 90 TABLET | Refills: 3 | Status: SHIPPED | OUTPATIENT
Start: 2025-06-09 | End: 2026-06-09

## 2025-06-09 NOTE — ASSESSMENT & PLAN NOTE
Stable.   Continue Fluoxetine 10 mg + Trazodone 100 mg nightly.    Exercise daily. Get sunlight daily.  Practice positive phrases and repeat throughout the day, along with yoga and relaxation techniques.  Establish good social support, make changes to reduce stress.  Reports any symptoms of suicidal/homicidal ideations or self harm immediately, if clinic is closed go to nearest emergency room.

## 2025-06-09 NOTE — PROGRESS NOTES
"   Primary Care    Lupis Peguero is a 79 y.o. female here today for a Medicare Annual Wellness visit and comprehensive Health Risk Assessment.     A separate E/M code has been provided to evaluate additional complaints that the patient would like addressed during the dedicated Medicare Wellness Exam.    CARDIOVASCULAR:  She reports increasing shortness of breath, particularly with heat exposure or after bending over. She has a history of carotid artery stenosis and aortic valve insufficiency.    GI CONCERNS:  She has a history of diarrhea for which she was prescribed Imodium. Due to resulting constipation, she now manages her bowel movements with one of the Imodium at night and a stool softener in the morning.    MEDICAL HISTORY:  She has osteoporosis, confirmed by bone density scan last year. She did not start Prolia previously due to the cost. She would like to start this medication now.     MEDICATIONS:  She takes Losartan 100 mg and Amlodipine 5 mg in the morning for blood pressure management. She discontinued atorvastatin 20 mg due to concerns about cancer risk. She also takes calcium and vitamin D supplements.    LABS:  CBC shows improved hemoglobin and hematocrit with normal white count. Blood sugar was 101.   Total cholesterol increased from 171 to 252, with HDL at 55, triglycerides increased from 144 to 182, and LDL increased from 90 to 161.   Thyroid level is normal.   Hepatitis C and HIV tests were negative.       Subjective   The following components were reviewed and updated:  Medical history  Family History  Social history  Allergies  Current Medications  Immunizations  Health Maintenance  Patient Care Team    Review of Systems  A comprehensive review of systems was conducted and is negative except as noted above.     Objective   Visit Vitals  BP (!) 167/65 (BP Location: Left arm, Patient Position: Sitting)   Pulse 72   Temp 97.9 °F (36.6 °C)   Resp 20   Ht 5' 2" (1.575 m)   Wt 68.4 kg (150 lb " 12.8 oz)   SpO2 96%   BMI 27.58 kg/m²        Physical Exam  Vitals reviewed.   Constitutional:       General: She is not in acute distress.     Appearance: Normal appearance.   Cardiovascular:      Rate and Rhythm: Normal rate and regular rhythm.      Heart sounds: No murmur heard.     No friction rub. No gallop.   Pulmonary:      Effort: No respiratory distress.      Breath sounds: No wheezing, rhonchi or rales.   Musculoskeletal:         General: No swelling, tenderness or deformity.      Right lower leg: No edema.      Left lower leg: No edema.   Skin:     General: Skin is warm and dry.      Findings: No lesion or rash.   Neurological:      General: No focal deficit present.      Mental Status: She is alert.   Psychiatric:         Mood and Affect: Mood normal.       Assessment/Plan:  1. Medicare annual wellness visit, subsequent    2. Mammogram declined    3. Mixed hyperlipidemia  Overview:  Lab Results   Component Value Date    .00 (H) 06/05/2025    TRIG 182 (H) 06/05/2025    HDL 55 06/05/2025    TOTALCHOLEST 5 06/05/2025     Stressed importance of dietary modifications. Follow a low cholesterol, low saturated fat diet with less that 200mg of cholesterol a day.  Avoid fried foods and high saturated fats (high saturated fats less than 7% of calories).  Add Flax Seed/Fish Oil supplements to diet. Increase dietary fiber.  Regular exercise can reduce LDL and raise HDL.      Assessment & Plan:  Monitored cholesterol levels which have increased: total cholesterol from 171 to 252, triglycerides from 144 to 182, LDL from 90 to 161.  Educated the patient on the importance of statin therapy for managing cholesterol levels.  Restarted atorvastatin 20 mg daily for cholesterol management.    Orders:  -     atorvastatin (LIPITOR) 20 MG tablet; Take 1 tablet (20 mg total) by mouth once daily.  Dispense: 90 tablet; Refill: 3    4. Age-related osteoporosis without current pathological fracture  Assessment &  Plan:  Evaluated bone density scan from last year showing osteoporosis.  Continued OTC calcium and vitamin D supplements.  Ordered labs to be completed a few days before next appointment: vitamin D level and phosphorus level.  Planned to administer Prolia 60 mg injection every 6 months for osteoporosis management after obtaining these lab results.    Orders:  -     denosumab (PROLIA) 60 mg/mL Syrg; Inject 1 mL (60 mg total) into the skin every 6 (six) months.  Dispense: 2 mL; Refill: 0  -     Phosphorus; Future; Expected date: 06/09/2025  -     Vitamin D; Future; Expected date: 06/09/2025    5. Primary hypertension  Assessment & Plan:  Recorded blood pressure at 167/65 during the visit, which is slightly elevated.  Continued amlodipine 5 mg and losartan 100 mg for BP control, both taken in the morning.  Instructed the patient to monitor BP at home with an arm cuff (not wrist) BP monitor.  Planned to recheck blood pressure in 2 weeks.   Considered adding diuretic if blood pressure remains elevated.      6. Dyspnea on exertion  -     X-Ray Chest PA And Lateral; Future; Expected date: 06/09/2025    7. Nonrheumatic aortic (valve) insufficiency  Assessment & Plan:  Reviewed records indicating aortic valve insufficiency.  Evaluated that the patient experiences shortness of breath sometimes when hot or bending over.  Explained the relationship between aortic valve insufficiency and these symptoms.  CXR ordered.   Considered prescribing a diuretic to help manage the hypertension, aortic insufficiency and associated shortness of breath.  Will reassess at next visit in two weeks.       8. Bilateral carotid artery stenosis  Overview:  Managed by [facility].   Echo 12/23: 55%, mild AI  CUS 6/24: mod R (127), mild L  CUS 2/ 24: Mod R 130, mild L     Assessment & Plan:  Stable.   Ms. Baugh is scheduled to follow up with [facility] in one year for CUS.       9. Moderate episode of recurrent major depressive disorder  Assessment & Plan:  Stable.    Continue Fluoxetine 10 mg + Trazodone 100 mg nightly.    Exercise daily. Get sunlight daily.  Practice positive phrases and repeat throughout the day, along with yoga and relaxation techniques.  Establish good social support, make changes to reduce stress.  Reports any symptoms of suicidal/homicidal ideations or self harm immediately, if clinic is closed go to nearest emergency room.        A comprehensive HEALTH RISK ASSESSMENT was completed today. Results are summarized below:    There are NO EMOTIONAL/SOCIAL CONCERNS identified on today's screening for Social Isolation, Depression and Anxiety.       There are NO FUNCTIONAL OR SAFETY CONCERNS were identified on today's screening for Physical Symptoms, Nutritional, Cognitive Function, Home Safety/Living Situation, Fall Risk, Activities of Daily Living, Independent Activities of Daily Living, Physical Activity, Timed Up and Go test and Whisper test.   The patient reports NO OPIOID PRESCRIPTIONS. This was confirmed through medication reconciliation.    The patient is NOT A TOBACCO USER.  The patient reports NO SIGNIFICANT ALCOHOL USE.     All Questions regarding food, transportation or housing were not answered today.    I provided Lupis Peguero with a 5-10 year written Screening Schedule per USPSTF age appropriate recommendations and a Personal Prevention Plan based on the results of today's Health Risk Assessment. Education, counseling, and referrals were provided as documented above and can be viewed in the After Visit Summary.    Follow up in about 2 weeks (around 6/23/2025) for HTN F/U. In addition to this scheduled follow up, the patient has also been instructed to follow up on as needed basis.     Advance Care Planning     Date: 06/09/2025  Patient did not wish or was not able to name a surrogate decision maker or provide an Advance Care Plan.

## 2025-06-09 NOTE — ASSESSMENT & PLAN NOTE
Evaluated bone density scan from last year showing osteoporosis.  Continued OTC calcium and vitamin D supplements.  Ordered labs to be completed a few days before next appointment: vitamin D level and phosphorus level.  Planned to administer Prolia 60 mg injection every 6 months for osteoporosis management after obtaining these lab results.   none

## 2025-06-09 NOTE — ASSESSMENT & PLAN NOTE
Reviewed records indicating aortic valve insufficiency.  Evaluated that the patient experiences shortness of breath sometimes when hot or bending over.  Explained the relationship between aortic valve insufficiency and these symptoms.  CXR ordered.   Considered prescribing a diuretic to help manage the hypertension, aortic insufficiency and associated shortness of breath.  Will reassess at next visit in two weeks.

## 2025-06-09 NOTE — ASSESSMENT & PLAN NOTE
Recorded blood pressure at 167/65 during the visit, which is slightly elevated.  Continued amlodipine 5 mg and losartan 100 mg for BP control, both taken in the morning.  Instructed the patient to monitor BP at home with an arm cuff (not wrist) BP monitor.  Planned to recheck blood pressure in 2 weeks.   Considered adding diuretic if blood pressure remains elevated.

## 2025-06-09 NOTE — ASSESSMENT & PLAN NOTE
Monitored cholesterol levels which have increased: total cholesterol from 171 to 252, triglycerides from 144 to 182, LDL from 90 to 161.  Educated the patient on the importance of statin therapy for managing cholesterol levels.  Restarted atorvastatin 20 mg daily for cholesterol management.

## 2025-06-24 ENCOUNTER — HOSPITAL ENCOUNTER (OUTPATIENT)
Dept: RADIOLOGY | Facility: HOSPITAL | Age: 80
Discharge: HOME | End: 2025-06-24
Payer: MEDICARE

## 2025-06-24 DIAGNOSIS — R06.09 DYSPNEA ON EXERTION: ICD-10-CM

## 2025-06-24 PROCEDURE — 71046 X-RAY EXAM CHEST 2 VIEWS: CPT | Mod: TC

## 2025-06-25 ENCOUNTER — OFFICE VISIT (OUTPATIENT)
Dept: FAMILY MEDICINE | Facility: CLINIC | Age: 80
End: 2025-06-25
Payer: MEDICARE

## 2025-06-25 VITALS
BODY MASS INDEX: 27.57 KG/M2 | WEIGHT: 149.81 LBS | HEART RATE: 67 BPM | RESPIRATION RATE: 20 BRPM | TEMPERATURE: 98 F | HEIGHT: 62 IN | SYSTOLIC BLOOD PRESSURE: 162 MMHG | DIASTOLIC BLOOD PRESSURE: 80 MMHG | OXYGEN SATURATION: 96 %

## 2025-06-25 DIAGNOSIS — M81.0 AGE-RELATED OSTEOPOROSIS WITHOUT CURRENT PATHOLOGICAL FRACTURE: ICD-10-CM

## 2025-06-25 DIAGNOSIS — I35.1 NONRHEUMATIC AORTIC (VALVE) INSUFFICIENCY: ICD-10-CM

## 2025-06-25 DIAGNOSIS — R06.09 DYSPNEA ON EXERTION: ICD-10-CM

## 2025-06-25 DIAGNOSIS — I10 PRIMARY HYPERTENSION: Primary | ICD-10-CM

## 2025-06-25 PROCEDURE — G2211 COMPLEX E/M VISIT ADD ON: HCPCS | Mod: ,,,

## 2025-06-25 PROCEDURE — 99214 OFFICE O/P EST MOD 30 MIN: CPT | Mod: ,,,

## 2025-06-25 RX ORDER — FUROSEMIDE 20 MG/1
20 TABLET ORAL DAILY
Qty: 90 TABLET | Refills: 0 | Status: SHIPPED | OUTPATIENT
Start: 2025-06-25 | End: 2025-06-25

## 2025-06-25 RX ORDER — FUROSEMIDE 20 MG/1
20 TABLET ORAL EVERY OTHER DAY
Qty: 45 TABLET | Refills: 0 | Status: SHIPPED | OUTPATIENT
Start: 2025-06-25 | End: 2025-09-23

## 2025-06-25 NOTE — ASSESSMENT & PLAN NOTE
Discussed how aortic valve insufficiency is contributing to shortness of breath.  Prescribed Lasix 20 mg every other day (adjusted from daily dosing to mitigate potential side effects) to manage symptoms.

## 2025-06-25 NOTE — ASSESSMENT & PLAN NOTE
Lupis experiences shortness of breath.  This is likely related to aortic valve insufficiency and hypertension.  Management includes the Lasix prescription as noted above.

## 2025-06-25 NOTE — ASSESSMENT & PLAN NOTE
Continue Amlodipine 5 mg daily and Losartan 100 mg daily in the morning.  Blood pressure measured at today's appointment was 162/80.  Prescribed Lasix 20 mg every other day (adjusted from daily dosing to mitigate potential side effects).    Advised patient to monitor blood pressure at home using a blood pressure cuff and to contact the office if BP drops below 100/60 with symptoms.  Educated on potential side effects including weakness, fatigue, and muscle cramps.

## 2025-06-25 NOTE — PROGRESS NOTES
Patient ID: [id number]     Chief Complaint: Hypertension (2 wk f/u)    HPI:     Lupis presents today for two-week blood pressure follow-up    HYPERTENSION:  She continues current antihypertensive medication regimen of Amlodipine 5 mg and Losartan 100 mg daily in the morning as previously prescribed. She reports adherence to medication plan. She did not bring in BP logs for review.     SHORTNESS OF BREATH:  She reports experiencing shortness of breath, particularly triggered by heat and during physical activity. She describes becoming short of breath when attempting to do tasks in warm conditions. She has a history of an aortic valve condition that may contribute to her respiratory symptoms.    IMAGING:  Chest XR was negative for fluid but demonstrated bone demineralization consistent with osteoporosis.    LABS / TEST RESULTS:  Laboratory studies show normal vitamin D and phosphorus levels, which are supportive of Prolia administration.       Past Medical History:   Diagnosis Date    Amnesia     Anxiety disorder, unspecified     GERD (gastroesophageal reflux disease)     HTN (hypertension)     Insomnia     Major depression, recurrent     Mass of right breast     Mixed hyperlipidemia     Personal history of colonic polyps 05/11/2023    s/p polypectomy - Dr Han Brown    Vitamin D deficiency         Past Surgical History:   Procedure Laterality Date    APPENDECTOMY  1959    ASPIRATION OF SYNOVIAL CYST Left 09/01/2016    hand    BACK SURGERY  1996    COLONOSCOPY  10/18/2017    COLONOSCOPY  1997    COLONOSCOPY N/A 05/11/2023    Dr Han Brown    COLONOSCOPY N/A 8/30/2024    Procedure: COLONOSCOPY;  Surgeon: Socorro Ann MD;  Location: [facility];  Service: General;  Laterality: N/A;    EYE SURGERY Bilateral     CATARACT    LIGATION, HEMORRHOIDS N/A 05/11/2023    Dr Han Brown    MASS EXCISION Left 09/01/2016    thigh        Social History     Socioeconomic History    Marital status:     Tobacco Use    Smoking status: Never    Smokeless tobacco: Never   Substance and Sexual Activity    Alcohol use: Never    Drug use: Never    Sexual activity: Not Currently     Social Drivers of Health     Financial Resource Strain: Low Risk  (8/28/2024)    Overall Financial Resource Strain (CARDIA)     Difficulty of Paying Living Expenses: Not hard at all   Food Insecurity: No Food Insecurity (8/28/2024)    Hunger Vital Sign     Worried About Running Out of Food in the Last Year: Never true     Ran Out of Food in the Last Year: Never true   Transportation Needs: No Transportation Needs (8/28/2024)    TRANSPORTATION NEEDS     Transportation : No   Physical Activity: Sufficiently Active (8/28/2024)    Exercise Vital Sign     Days of Exercise per Week: 6 days     Minutes of Exercise per Session: 30 min   Stress: Stress Concern Present (8/28/2024)    [employer] - Occupational Stress Questionnaire     Feeling of Stress : To some extent   Housing Stability: Unknown (8/28/2024)    Housing Stability Vital Sign     Unable to Pay for Housing in the Last Year: No     Homeless in the Last Year: No        Current Outpatient Medications   Medication Instructions    amLODIPine (NORVASC) 5 mg, Daily    aspirin (ECOTRIN) 81 mg, Daily    atorvastatin (LIPITOR) 20 mg, Oral, Daily    coenzyme Q10 100 mg, Daily    denosumab (PROLIA) 60 mg, Subcutaneous, Every 6 months    FLUoxetine 10 MG capsule TAKE 1 CAPSULE BY MOUTH EVERY DAY    fluticasone propionate (FLONASE) 50 mcg, Each Nostril    furosemide (LASIX) 20 mg, Oral, Every other day    losartan (COZAAR) 100 mg, Oral, Daily    melatonin 10 mg Tab Take by mouth.    montelukast (SINGULAIR) 10 mg, Oral    omeprazole (PRILOSEC) 40 mg, Oral    traZODone (DESYREL) 100 mg, Oral, Nightly    vit B-comp w-Fe,Ca,FA<1mg (IRON-VITAMINS ORAL) Take by mouth.    vitamin D (VITAMIN D3) 1,000 Units, Daily       Review of patient's allergies indicates:  No Known Allergies  "    Patient Care Team:  Chema Weldon DO as PCP - General (Family Medicine)  Kelby Butcher MD (Ophthalmology)  Han Brown MD as Consulting Physician (Gastroenterology)  Des Felipe FNP as Nurse Practitioner (Cardiology)     Subjective:     Review of Systems    12 point review of systems conducted, negative except as stated in the history of present illness. See HPI for details.    Objective:     Visit Vitals  BP (!) 162/80 (BP Location: Left arm, Patient Position: Sitting)   Pulse 67   Temp 97.5 °F (36.4 °C)   Resp 20   Ht 5' 2" (1.575 m)   Wt 67.9 kg (149 lb 12.8 oz)   SpO2 96%   BMI 27.40 kg/m²       Physical Exam  Vitals reviewed.   Constitutional:       General: She is not in acute distress.     Appearance: Normal appearance.   Cardiovascular:      Rate and Rhythm: Normal rate and regular rhythm.      Heart sounds: No murmur heard.     No friction rub. No gallop.   Pulmonary:      Effort: No respiratory distress.      Breath sounds: No wheezing, rhonchi or rales.   Musculoskeletal:         General: No swelling, tenderness or deformity.      Right lower leg: No edema.      Left lower leg: No edema.   Skin:     General: Skin is warm and dry.      Findings: No lesion or rash.   Neurological:      General: No focal deficit present.      Mental Status: She is alert.   Psychiatric:         Mood and Affect: Mood normal.         Labs Reviewed:     Chemistry:  Lab Results   Component Value Date     06/05/2025    K 4.4 06/05/2025    BUN 16.0 06/05/2025    CREATININE 0.82 06/05/2025    EGFRNORACEVR >60 06/05/2025    CALCIUM 9.3 06/05/2025    ALKPHOS 77 06/05/2025    ALBUMIN 3.7 06/05/2025    BILIDIR 0.2 04/28/2021    IBILI 0.20 04/28/2021    AST 25 06/05/2025    ALT 31 06/05/2025    PHOS 3.0 06/24/2025    [id number] 54 06/24/2025    TSH 1.559 06/05/2025      Hematology:  Lab Results   Component Value Date    WBC 6.02 06/05/2025    HGB 12.9 06/05/2025    HCT 39.8 06/05/2025     " 06/05/2025       Lipid Panel:  Lab Results   Component Value Date    CHOL 252 (H) 06/05/2025    HDL 55 06/05/2025    .00 (H) 06/05/2025    TRIG 182 (H) 06/05/2025    TOTALCHOLEST 5 06/05/2025        Urine:  Lab Results   Component Value Date    APPEARANCEUA Clear 08/28/2024    SGUA 1.010 08/28/2024    PROTEINUA Negative 08/28/2024    KETONESUA Negative 08/28/2024    LEUKOCYTESUR Negative 08/28/2024    RBCUA 0-2 08/28/2024    WBCUA None Seen 08/28/2024    BACTERIA Rare 08/28/2024     Assessment:       ICD-10-CM ICD-9-CM   1. Primary hypertension  I10 401.9   2. Nonrheumatic aortic (valve) insufficiency  I35.1 424.1   3. Age-related osteoporosis without current pathological fracture  M81.0 733.01   4. Dyspnea on exertion  R06.09 786.09     Plan:     1. Primary hypertension  Assessment & Plan:  Continue Amlodipine 5 mg daily and Losartan 100 mg daily in the morning.  Blood pressure measured at today's appointment was 162/80.  Prescribed Lasix 20 mg every other day (adjusted from daily dosing to mitigate potential side effects).    Advised patient to monitor blood pressure at home using a blood pressure cuff and to contact the office if BP drops below 100/60 with symptoms.  Educated on potential side effects including weakness, fatigue, and muscle cramps.    Orders:  -     Discontinue: furosemide (LASIX) 20 MG tablet; Take 1 tablet (20 mg total) by mouth once daily.  Dispense: 90 tablet; Refill: 0  -     furosemide (LASIX) 20 MG tablet; Take 1 tablet (20 mg total) by mouth every other day.  Dispense: 45 tablet; Refill: 0  -     Basic Metabolic Panel; Future; Expected date: 07/14/2025    2. Nonrheumatic aortic (valve) insufficiency  Assessment & Plan:  Discussed how aortic valve insufficiency is contributing to shortness of breath.  Prescribed Lasix 20 mg every other day (adjusted from daily dosing to mitigate potential side effects) to manage symptoms.    Orders:  -     Discontinue: furosemide (LASIX) 20 MG  tablet; Take 1 tablet (20 mg total) by mouth once daily.  Dispense: 90 tablet; Refill: 0  -     furosemide (LASIX) 20 MG tablet; Take 1 tablet (20 mg total) by mouth every other day.  Dispense: 45 tablet; Refill: 0    3. Age-related osteoporosis without current pathological fracture  Assessment & Plan:  Will continue Prolia treatment as vitamin D and phosphorus levels are normal.      4. Dyspnea on exertion  Assessment & Plan:  Lupis experiences shortness of breath.  This is likely related to aortic valve insufficiency and hypertension.  Management includes the Lasix prescription as noted above.      FOLLOW-UP AND MONITORING:  Ordered renal function test and potassium level check before next appointment.  Follow up in 1 month.  Lupis instructed to contact the office before the next appointment if experiencing weakness, fatigue, muscle cramps.       Follow up in about 1 month (around 7/25/2025) for HTN F/U with labs before. In addition to their scheduled follow up, the patient has also been instructed to follow up on as needed basis.     This note was generated with the assistance of ambient listening technology. Verbal consent was obtained by the patient and accompanying visitor(s) for the recording of patient appointment to facilitate this note. I attest to having reviewed and edited the generated note for accuracy, though some syntax or spelling errors may persist. Please contact the author of this note for any clarification.      Trang Kay, Adult-Gerontology NP

## 2025-07-18 DIAGNOSIS — F41.9 ANXIETY: ICD-10-CM

## 2025-07-18 RX ORDER — FLUOXETINE 10 MG/1
10 CAPSULE ORAL
Qty: 90 CAPSULE | Refills: 1 | Status: SHIPPED | OUTPATIENT
Start: 2025-07-18

## 2025-08-11 ENCOUNTER — OFFICE VISIT (OUTPATIENT)
Dept: FAMILY MEDICINE | Facility: CLINIC | Age: 80
End: 2025-08-11
Payer: MEDICARE

## 2025-08-11 VITALS
SYSTOLIC BLOOD PRESSURE: 128 MMHG | OXYGEN SATURATION: 96 % | HEART RATE: 81 BPM | BODY MASS INDEX: 27.67 KG/M2 | WEIGHT: 150.38 LBS | RESPIRATION RATE: 18 BRPM | TEMPERATURE: 98 F | HEIGHT: 62 IN | DIASTOLIC BLOOD PRESSURE: 71 MMHG

## 2025-08-11 DIAGNOSIS — R30.0 DYSURIA: ICD-10-CM

## 2025-08-11 DIAGNOSIS — I35.1 NONRHEUMATIC AORTIC (VALVE) INSUFFICIENCY: ICD-10-CM

## 2025-08-11 DIAGNOSIS — I10 PRIMARY HYPERTENSION: ICD-10-CM

## 2025-08-11 DIAGNOSIS — R41.3 OTHER AMNESIA: ICD-10-CM

## 2025-08-11 DIAGNOSIS — R41.89 COGNITIVE DECLINE: Primary | ICD-10-CM

## 2025-08-11 PROCEDURE — 99214 OFFICE O/P EST MOD 30 MIN: CPT | Mod: ,,,

## 2025-08-11 PROCEDURE — G2211 COMPLEX E/M VISIT ADD ON: HCPCS | Mod: ,,,

## 2025-08-11 RX ORDER — LANOLIN ALCOHOL/MO/W.PET/CERES
5000 CREAM (GRAM) TOPICAL DAILY
COMMUNITY

## 2025-08-12 ENCOUNTER — LAB VISIT (OUTPATIENT)
Dept: LAB | Facility: HOSPITAL | Age: 80
End: 2025-08-12
Payer: MEDICARE

## 2025-08-12 DIAGNOSIS — R41.89 COGNITIVE DECLINE: ICD-10-CM

## 2025-08-12 DIAGNOSIS — I10 PRIMARY HYPERTENSION: ICD-10-CM

## 2025-08-12 DIAGNOSIS — R39.15 URGENCY OF URINATION: ICD-10-CM

## 2025-08-12 DIAGNOSIS — R39.15 URGENCY OF URINATION: Primary | ICD-10-CM

## 2025-08-12 LAB
BACTERIA #/AREA URNS AUTO: ABNORMAL /HPF
BILIRUB UR QL STRIP.AUTO: NEGATIVE
CLARITY UR: CLEAR
COLOR UR AUTO: YELLOW
GLUCOSE UR QL STRIP: NEGATIVE
HGB UR QL STRIP: ABNORMAL
KETONES UR QL STRIP: NEGATIVE
LEUKOCYTE ESTERASE UR QL STRIP: NEGATIVE
NITRITE UR QL STRIP: NEGATIVE
PH UR STRIP: 6 [PH]
PROT UR QL STRIP: NEGATIVE
RBC #/AREA URNS AUTO: ABNORMAL /HPF
SP GR UR STRIP.AUTO: 1.01 (ref 1–1.03)
SQUAMOUS #/AREA URNS AUTO: ABNORMAL /HPF
UROBILINOGEN UR STRIP-ACNC: 0.2
WBC #/AREA URNS AUTO: ABNORMAL /HPF

## 2025-08-12 PROCEDURE — 81003 URINALYSIS AUTO W/O SCOPE: CPT

## 2025-08-14 ENCOUNTER — RESULTS FOLLOW-UP (OUTPATIENT)
Dept: FAMILY MEDICINE | Facility: CLINIC | Age: 80
End: 2025-08-14
Payer: MEDICARE

## 2025-08-19 ENCOUNTER — HOSPITAL ENCOUNTER (OUTPATIENT)
Dept: RADIOLOGY | Facility: HOSPITAL | Age: 80
Discharge: HOME | End: 2025-08-19
Payer: MEDICARE

## 2025-08-19 DIAGNOSIS — R41.89 COGNITIVE DECLINE: ICD-10-CM

## 2025-08-19 DIAGNOSIS — R41.3 OTHER AMNESIA: ICD-10-CM

## 2025-08-19 LAB
ANION GAP SERPL CALC-SCNC: 8 MEQ/L
BUN SERPL-MCNC: 19 MG/DL (ref 9.8–20.1)
CALCIUM SERPL-MCNC: 9.1 MG/DL (ref 8.4–10.2)
CHLORIDE SERPL-SCNC: 107 MMOL/L (ref 98–107)
CO2 SERPL-SCNC: 27 MMOL/L (ref 23–31)
CREAT SERPL-MCNC: 0.86 MG/DL (ref 0.55–1.02)
CREAT/UREA NIT SERPL: 22
FOLATE SERPL-MCNC: 7 NG/ML (ref 7–31.4)
GFR SERPLBLD CREATININE-BSD FMLA CKD-EPI: >60 ML/MIN/1.73/M2
GLUCOSE SERPL-MCNC: 117 MG/DL (ref 82–115)
POTASSIUM SERPL-SCNC: 5.3 MMOL/L (ref 3.5–5.1)
SODIUM SERPL-SCNC: 142 MMOL/L (ref 136–145)
VIT B12 SERPL-MCNC: 1243 PG/ML (ref 213–816)

## 2025-08-19 PROCEDURE — 82607 VITAMIN B-12: CPT

## 2025-08-19 PROCEDURE — 82746 ASSAY OF FOLIC ACID SERUM: CPT

## 2025-08-21 ENCOUNTER — TELEPHONE (OUTPATIENT)
Dept: FAMILY MEDICINE | Facility: CLINIC | Age: 80
End: 2025-08-21
Payer: MEDICARE

## 2025-08-22 ENCOUNTER — HOSPITAL ENCOUNTER (OUTPATIENT)
Dept: RADIOLOGY | Facility: HOSPITAL | Age: 80
Discharge: HOME | End: 2025-08-22
Payer: MEDICARE

## 2025-08-22 PROCEDURE — 70551 MRI BRAIN STEM W/O DYE: CPT | Mod: TC

## (undated) DEVICE — ADAPTER AIR/WATER CHANNEL CLEA

## (undated) DEVICE — TUBE SUCTION MEDI-VAC STERILE

## (undated) DEVICE — TUBE ASPIRATING LUKI 3-1/4IN

## (undated) DEVICE — SOL IRRI STRL WATER 1000ML

## (undated) DEVICE — Device

## (undated) DEVICE — SPONGE COTTON TRAY 4X4IN

## (undated) DEVICE — SNARE EXACTO COLD

## (undated) DEVICE — KIT SURGICAL COLON .25 1.1OZ

## (undated) DEVICE — TUBE WATER AUXILIARY

## (undated) DEVICE — LINER SUCTION KV 5 2000ML

## (undated) DEVICE — LINER SUCTION CANISTER (DISP)

## (undated) DEVICE — TRAP SUCTION POLYP